# Patient Record
Sex: FEMALE | Race: WHITE | NOT HISPANIC OR LATINO | Employment: FULL TIME | URBAN - METROPOLITAN AREA
[De-identification: names, ages, dates, MRNs, and addresses within clinical notes are randomized per-mention and may not be internally consistent; named-entity substitution may affect disease eponyms.]

---

## 2019-08-26 ENCOUNTER — TELEPHONE (OUTPATIENT)
Dept: UROLOGY | Facility: CLINIC | Age: 39
End: 2019-08-26

## 2019-08-29 ENCOUNTER — OFFICE VISIT (OUTPATIENT)
Dept: NEPHROLOGY | Facility: CLINIC | Age: 39
End: 2019-08-29
Payer: COMMERCIAL

## 2019-08-29 ENCOUNTER — APPOINTMENT (OUTPATIENT)
Dept: LAB | Facility: HOSPITAL | Age: 39
End: 2019-08-29
Attending: UROLOGY
Payer: COMMERCIAL

## 2019-08-29 ENCOUNTER — OFFICE VISIT (OUTPATIENT)
Dept: UROLOGY | Facility: CLINIC | Age: 39
End: 2019-08-29
Payer: COMMERCIAL

## 2019-08-29 VITALS
DIASTOLIC BLOOD PRESSURE: 96 MMHG | BODY MASS INDEX: 48.44 KG/M2 | HEART RATE: 66 BPM | WEIGHT: 263.25 LBS | HEIGHT: 62 IN | SYSTOLIC BLOOD PRESSURE: 192 MMHG

## 2019-08-29 VITALS
BODY MASS INDEX: 48.48 KG/M2 | SYSTOLIC BLOOD PRESSURE: 170 MMHG | HEART RATE: 91 BPM | OXYGEN SATURATION: 98 % | HEIGHT: 62 IN | DIASTOLIC BLOOD PRESSURE: 60 MMHG | WEIGHT: 263.44 LBS

## 2019-08-29 DIAGNOSIS — R93.429 ABNORMAL ULTRASOUND OF KIDNEY: Primary | ICD-10-CM

## 2019-08-29 DIAGNOSIS — R93.429 ABNORMAL RENAL ULTRASOUND: Primary | ICD-10-CM

## 2019-08-29 DIAGNOSIS — R10.84 GENERALIZED ABDOMINAL PAIN: ICD-10-CM

## 2019-08-29 DIAGNOSIS — R07.9 CHEST PAIN, UNSPECIFIED TYPE: ICD-10-CM

## 2019-08-29 DIAGNOSIS — R31.29 MICROHEMATURIA: ICD-10-CM

## 2019-08-29 LAB
BACTERIA #/AREA URNS HPF: NORMAL /HPF
BILIRUB SERPL-MCNC: ABNORMAL MG/DL
BLOOD URINE, POC: ABNORMAL
COLOR, POC UA: YELLOW
GLUCOSE UR QL STRIP: ABNORMAL
KETONES UR QL STRIP: ABNORMAL
LEUKOCYTE ESTERASE URINE, POC: ABNORMAL
MICROSCOPIC COMMENT: NORMAL
NITRITE, POC UA: ABNORMAL
PH, POC UA: 6
PROTEIN, POC: ABNORMAL
RBC #/AREA URNS HPF: 1 /HPF (ref 0–4)
SPECIFIC GRAVITY, POC UA: 1.02
SQUAMOUS #/AREA URNS HPF: 3 /HPF
UROBILINOGEN, POC UA: 1
WBC #/AREA URNS HPF: 2 /HPF (ref 0–5)

## 2019-08-29 PROCEDURE — 99245 PR OFFICE CONSULTATION,LEVEL V: ICD-10-PCS | Mod: 25,S$GLB,, | Performed by: UROLOGY

## 2019-08-29 PROCEDURE — 81000 URINALYSIS NONAUTO W/SCOPE: CPT

## 2019-08-29 PROCEDURE — 99245 OFF/OP CONSLTJ NEW/EST HI 55: CPT | Mod: 25,S$GLB,, | Performed by: UROLOGY

## 2019-08-29 PROCEDURE — 99244 PR OFFICE CONSULTATION,LEVEL IV: ICD-10-PCS | Mod: S$GLB,,, | Performed by: INTERNAL MEDICINE

## 2019-08-29 PROCEDURE — 99999 PR PBB SHADOW E&M-EST. PATIENT-LVL III: CPT | Mod: PBBFAC,,, | Performed by: UROLOGY

## 2019-08-29 PROCEDURE — 99999 PR PBB SHADOW E&M-EST. PATIENT-LVL III: ICD-10-PCS | Mod: PBBFAC,,, | Performed by: INTERNAL MEDICINE

## 2019-08-29 PROCEDURE — 99244 OFF/OP CNSLTJ NEW/EST MOD 40: CPT | Mod: S$GLB,,, | Performed by: INTERNAL MEDICINE

## 2019-08-29 PROCEDURE — 99999 PR PBB SHADOW E&M-EST. PATIENT-LVL III: CPT | Mod: PBBFAC,,, | Performed by: INTERNAL MEDICINE

## 2019-08-29 PROCEDURE — 99999 PR PBB SHADOW E&M-EST. PATIENT-LVL III: ICD-10-PCS | Mod: PBBFAC,,, | Performed by: UROLOGY

## 2019-08-29 PROCEDURE — 81002 URINALYSIS NONAUTO W/O SCOPE: CPT | Mod: S$GLB,,, | Performed by: UROLOGY

## 2019-08-29 PROCEDURE — 81002 POCT URINE DIPSTICK WITHOUT MICROSCOPE: ICD-10-PCS | Mod: S$GLB,,, | Performed by: UROLOGY

## 2019-08-29 RX ORDER — BUPROPION HYDROCHLORIDE 100 MG/1
TABLET ORAL
COMMUNITY
Start: 2019-08-01 | End: 2019-12-06

## 2019-08-29 RX ORDER — METOPROLOL SUCCINATE 25 MG/1
25 TABLET, EXTENDED RELEASE ORAL NIGHTLY
Refills: 5 | COMMUNITY
Start: 2019-07-17 | End: 2019-12-13

## 2019-08-29 RX ORDER — NIFEDIPINE 30 MG/1
30 TABLET, FILM COATED, EXTENDED RELEASE ORAL
COMMUNITY
Start: 2019-05-06 | End: 2019-12-06

## 2019-08-29 RX ORDER — LISINOPRIL AND HYDROCHLOROTHIAZIDE 12.5; 2 MG/1; MG/1
1 TABLET ORAL 2 TIMES DAILY
COMMUNITY
End: 2020-12-17 | Stop reason: SDUPTHER

## 2019-08-29 RX ORDER — AMLODIPINE BESYLATE 10 MG/1
10 TABLET ORAL NIGHTLY
Refills: 3 | COMMUNITY
Start: 2019-08-02 | End: 2021-01-25

## 2019-08-29 RX ORDER — AZITHROMYCIN 250 MG/1
TABLET, FILM COATED ORAL
Refills: 0 | COMMUNITY
Start: 2019-08-15 | End: 2019-08-29

## 2019-08-29 NOTE — PATIENT INSTRUCTIONS
So she has an abnormal ultrasound the kidney in the left side, possibly a complex cyst or tumor or stone or nothing.  It was not seen on the ultrasound done just 2 months ago.  Neither than the best test.  Would prefer a CT abdomen with and without contrast to evaluate for stone or complex cyst which is what this most likely is if it is anything at all.  However they recommend a CT angiogram for her renal artery stenosis.  I have urgently referred her to Nephrology and they stated they could see her tomorrow.  I will wait to see what kind of test they are going to order and decide on imaging based on with a order.  If they do order CT angiogram I would like to see if we can get a CT without contrast 1st with the CT angiogram so we can compare before and after.  This would help us see if she has any stones or complex mass.    At minimum going to have her follow-up in 3 months so we can decide on further imaging if nothing done before then.    Also referred her to Dr. Corona follow with Cardiology for her chest pain jaw pain. Urgent referral.  She sees her PCP on Tuesday.    Follow up 3 months to review imaging    Route Dr. Saucedo and Barb Hadley

## 2019-08-29 NOTE — PROGRESS NOTES
Subjective:       Patient ID: Marilyn Madera is a 39 y.o. White female who presents for new patient evaluation for abnormal renal US.    Marilyn Madera is referred by RAGINI Chiang to be evaluated for abnormal renal US.  She reports she has had HTN for 13 years.  She had a recent renal US which showed a questionable area in her left mid-pole and was likely negative for SHANIQUE.  She is on four drugs for hypertension.  She has no uremic or urinary symptoms and is in her usual state of health.  There have been no recent illnesses, hospitalizations or procedures.        Review of Systems   Constitutional: Positive for fatigue. Negative for appetite change, chills and fever.   HENT: Negative for congestion.    Eyes: Negative for visual disturbance.   Respiratory: Negative for cough and shortness of breath.    Cardiovascular: Negative for chest pain and leg swelling.   Gastrointestinal: Negative for abdominal pain, diarrhea, nausea and vomiting.   Genitourinary: Negative for difficulty urinating, dysuria and hematuria.   Musculoskeletal: Negative for myalgias.   Skin: Negative for rash.   Neurological: Negative for headaches.   Hematological: Does not bruise/bleed easily.   Psychiatric/Behavioral: Negative for agitation and sleep disturbance.       The past medical, family and social histories were reviewed for this encounter.     Past Medical History:   Diagnosis Date    Hypertension      Past Surgical History:   Procedure Laterality Date    CHOLECYSTECTOMY      TUBAL LIGATION       Social History     Socioeconomic History    Marital status: Single     Spouse name: Not on file    Number of children: Not on file    Years of education: Not on file    Highest education level: Not on file   Occupational History    Not on file   Social Needs    Financial resource strain: Not on file    Food insecurity:     Worry: Not on file     Inability: Not on file    Transportation needs:     Medical: Not on file      "Non-medical: Not on file   Tobacco Use    Smoking status: Current Every Day Smoker    Tobacco comment: 3 - 4 cigs a day   Substance and Sexual Activity    Alcohol use: Yes     Comment: social    Drug use: Never    Sexual activity: Not on file   Lifestyle    Physical activity:     Days per week: Not on file     Minutes per session: Not on file    Stress: Not on file   Relationships    Social connections:     Talks on phone: Not on file     Gets together: Not on file     Attends Religion service: Not on file     Active member of club or organization: Not on file     Attends meetings of clubs or organizations: Not on file     Relationship status: Not on file   Other Topics Concern    Not on file   Social History Narrative    Not on file     Current Outpatient Medications   Medication Sig    amLODIPine (NORVASC) 10 MG tablet TAKE 1 TABLET BY MOUTH EVERY DAY FOR 90 DAYS    buPROPion (WELLBUTRIN) 100 MG tablet 1 qam x 1 week; then 1 bid x1 week; then 2 qam and 1 qpm x 1 week; then 2 bid thereafter    lisinopril-hydrochlorothiazide (PRINZIDE,ZESTORETIC) 20-12.5 mg per tablet lisinopril 20 mg-hydrochlorothiazide 12.5 mg tablet   TAKE TWO TABLETS BY MOUTH ONCE DAILY    metoprolol succinate (TOPROL-XL) 25 MG 24 hr tablet Take 25 mg by mouth nightly.    NIFEdipine (ADALAT CC) 30 MG TbSR Take 30 mg by mouth.     No current facility-administered medications for this visit.        BP (!) 170/60   Pulse 91   Ht 5' 2" (1.575 m)   Wt 119.5 kg (263 lb 7.2 oz)   LMP 08/01/2019   SpO2 98%   BMI 48.19 kg/m²     Objective:      Physical Exam   Constitutional: She is oriented to person, place, and time. She appears well-developed and well-nourished. No distress.   HENT:   Head: Normocephalic and atraumatic.   Eyes: Conjunctivae are normal.   Neck: Neck supple. No JVD present.   Cardiovascular: Normal rate, regular rhythm and normal heart sounds. Exam reveals no gallop and no friction rub.   No murmur " heard.  Pulmonary/Chest: Effort normal and breath sounds normal. No respiratory distress. She has no wheezes. She has no rales.   Abdominal: Soft. Bowel sounds are normal. She exhibits no distension (obese). There is no tenderness.   Musculoskeletal: She exhibits no edema.   Neurological: She is alert and oriented to person, place, and time.   Skin: Skin is warm and dry. No rash noted.   Psychiatric: She has a normal mood and affect.   Vitals reviewed.      Assessment:       1. Abnormal renal ultrasound    2. Generalized abdominal pain        Plan:   Return to clinic prn.  CT kidney to include renal arteries on 9/2 in [place].  Baseline creatinine is 0.6-0.8.  Renal US shows R 11.6 cm, L 11.6 cm.  We can get a CT with contrast of the kidneys to look at the questionable area on the left and the renal arteries.  More importantly, she has gained 30 pounds in the last year and 70 pounds in the last 2 years.  She urgently needs to diet and lose weight.  Otherwise we will never be able to control her blood pressure.  We discussed making lifestyle changes and using a Mediterranean diet for health benefits and weight loss.  I also noticed she is iron deficient and asked her to follow up with RAGINI Chiang to address this.

## 2019-08-29 NOTE — LETTER
August 29, 2019      Anne Marie Toussaint MD  [address] [place] [address]   [address]  [facility] [address]           [facility] [address] [facility] [address] [place] [address]  Phone: [phone]          Patient: Marilyn Madera   MR Number: [id number]   YOB: 1980   Date of Visit: 8/29/2019       Dear Dr. Anne Marie Toussaint:    Thank you for referring Marilyn Madera to me for evaluation. Attached you will find relevant portions of my assessment and plan of care.    If you have questions, please do not hesitate to call me. I look forward to following Marilyn Madera along with you.    Sincerely,    Sebastian Diez MD    Enclosure  CC:  No Recipients    If you would like to receive this communication electronically, please contact [email or web address] or [phone] to request more information on [employer] Link access.    For providers and/or their staff who would like to refer a patient to [facility], please contact us through our one-stop-shop provider referral line, [facility], at [phone].    If you feel you have received this communication in error or would no longer like to receive these types of communications, please e-mail [email or web address]

## 2019-08-29 NOTE — PROGRESS NOTES
[facility] Note - [place]  Staff: MD Norbert    Referring provider and please cc:   Karri Saucedo MD  [address] [place] [address]     PCP: RAGINI Chiang Utilization: signing up today     Chief Complaint: No chief complaint on file.      Subjective:        HPI: Marilyn Madera is a 39 y.o. female     Prior records available on Care everywhere    In May of 2019 she had a headache and neck pain went to [facility].  They ordered a CT head 05/03/2019 which was normal.  She also had an ultrasound then of her kidneys on 05/04/2019, the kidney function was normal.  In the ultrasound showed no abnormalities.  She was told she had an enlarged heart.  Blood pressure elevated despite being on 2 blood pressure meds, sees Dr. Snyder is a nurse practitioner regularly.  She ended up renal vascular ultrasound which showed results concerning for renal artery stenosis and recommended a CTA.  However that ultrasound also showed a 1.3 cm rounded echogenic focus in the left kidney without posterior acoustic shadowing that could be a cyst or mass or nothing that was not seen on previous ultrasound.  She has had no other abdominal imaging.     She does have trace blood in the urine.  Denies any recurrent urinary tract infections.  Denies any personal history of stones.  Then mother has had multiple stones. +smoker. No difficulty urinating. No gross hematuria. occ IBETH. Regular mense    She is supposed to see primary care to make follow-up for Cardiology.  She describes heart palpitations, jaw pain, feels like her heart is about to jump out of her chest but also admits to some anxiety too.  Teared up in appointment today.        Urinalysis void: tr bld - regular menses   Lab Results   Component Value Date    COLORU yellow 08/29/2019    SPECGRAV 1.025 08/29/2019    NITRITE n 08/29/2019      Urine history:   5/4/18 No cx, void: tr bld      REVIEW OF SYSTEMS:  General ROS: no fevers, no  chills  Psychological ROS: no depression  Endocrine ROS: no heat or cold  Respiratory ROS: no SOB  Cardiovascular ROS: + CP  Gastrointestinal ROS: no abdominal pain, no constipation, no diarrhea, no BRBPR  Musculoskeletal ROS: no muscle pain  Neurological ROS: no headaches  Dermatological ROS: no rashes  HEENT: noglasses, no sinus   ROS: per HPI     PMHx:  Past Medical History:   Diagnosis Date    Hypertension          PSHx:  Past Surgical History:   Procedure Laterality Date    CHOLECYSTECTOMY      TUBAL LIGATION         Stents/Valves/Foreign Bodies/Cardiac Evaluation/Cardiologist: none    No family history on file.   malignancies: none.   kidney stones: mother      Soc Hx:  Social History     Socioeconomic History    Marital status: Single     Spouse name: Not on file    Number of children: Not on file    Years of education: Not on file    Highest education level: Not on file   Occupational History    Not on file   Social Needs    Financial resource strain: Not on file    Food insecurity:     Worry: Not on file     Inability: Not on file    Transportation needs:     Medical: Not on file     Non-medical: Not on file   Tobacco Use    Smoking status: Not on file   Substance and Sexual Activity    Alcohol use: Not on file    Drug use: Not on file    Sexual activity: Not on file   Lifestyle    Physical activity:     Days per week: Not on file     Minutes per session: Not on file    Stress: Not on file   Relationships    Social connections:     Talks on phone: Not on file     Gets together: Not on file     Attends Yarsanism service: Not on file     Active member of club or organization: Not on file     Attends meetings of clubs or organizations: Not on file     Relationship status: Not on file   Other Topics Concern    Not on file   Social History Narrative    Not on file   smokes 3 to 4 cig/day trying to quit (used to smoke a pack a day). 22 pack year history.  No alcohol  2 children  [place]ives in  [place]  Patient's occupation:  at [employer]    Allergies:  Patient has no allergy information on record.    Anticoagulation/Aspirin: take bc powders for aches and pains    Objective:     Vitals:    08/29/19 1132   BP: (!) 192/96   Pulse: 66       General:WDWN in NAD  Eyes: PERRLA, normal conjunctiva  Respiratory: no increased work on breathing. No wheezing.   Cardiovascular: No obvious extremity edema. Warm and well perfused.   GI: no palpation of masses. No tenderness. No hepatosplenomegaly to palpation.  Musculoskeletal: normal range of motion of bilateral upper extremities. Normal muscle strength and tone.  Skin: no obvious rashes or lesions. No tightening of skin noted.  Neurologic: CN grossly normal. Normal sensation.   Psychiatric: awake, alert and oriented x 3. Mood and affect normal. Cooperative.    Pelvic exam   deferred    LABS REVIEW:    ]    ]labs reviewed in care everywhere  a1c 5.7 5/4/19  Cr 0.71 5/6/19    No results found for: LABA1C, HGBA1C       Recent Pertinent urologic PATHOLOGY REVIEW:      Recent Pertinent Urologic RADIOGRAPHIC REVIEW: (remainder of images reviewed)    No image results found.    Us renal artery 7/29/19  1. Limited exam secondary to habitus and overlying bowel gas with only partially visualization of the aorta and renal arteries.    2. Elevated renal artery to aorta ratio, possibly artifactual given the limited visualization of the aorta and renal arteries but concerning for occult renal artery stenosis. A CTA would be warranted for further characterization.     3. Rounded echogenic focus in the interpolar aspect of the left kidney, newly appreciated from the previous exam, similarly, possibly artifactual, related to a complex/complicated cyst containing milk of calcium, an angiomyolipoma, and much less likely malignancy. This can be further assessed with CT.    rbus 5/4/19  FINDINGS: Standard sonographic evaluation of the patient's bilateral kidneys was assessed  utilizing grayscale and color-flow imaging demonstrating decreased outline of the bilateral kidneys to in part to the patient's overlying bowel gas. The left kidney measures 11.1 cm in the longest dimension and the right kidney measures 11.8 cm longest dimension demonstrating evidence of normal cortical outline. Urinary bladder is filled to capacity without evidence of intraluminal masses nor significant wall thickening. Detail is limited given the patient's large body habitus. There is evidence of increased echogenicity of the kidneys that may be secondary to poor echo return given the patient's body habitus changes.            Assessment:       1. Abnormal ultrasound of kidney    2. Microhematuria    3. Chest pain, unspecified type          Plan:     There are no diagnoses linked to this encounter.    So she has an abnormal ultrasound the kidney in the left side, possibly a complex cyst or tumor or stone or nothing.  It was not seen on the ultrasound done just 2 months ago.  Neither than the best test.  Would prefer a CT abdomen with and without contrast to evaluate for stone or complex cyst which is what this most likely is if it is anything at all.  However they recommend a CT angiogram for her renal artery stenosis.  I have urgently referred her to Nephrology and they stated they could see her tomorrow.  I will wait to see what kind of test they are going to order and decide on imaging based on with a order.  If they do order CT angiogram I would like to see if we can get a CT without contrast 1st with the CT angiogram so we can compare before and after.  This would help us see if she has any stones or complex mass.    At minimum going to have her follow-up in 3 months so we can decide on further imaging if nothing done before then.    Also referred her to Dr. Corona follow with Cardiology for her chest pain jaw pain. Urgent referral.  She sees her PCP on Tuesday.    Sending UA microscopic, greater than 3 red  blood cells may need further imaging.  Does have a history of smoking 22 pack year.  Also may need just repeated cath urine for UA microscopic.    Follow up 3 months to review imaging    Route Dr. Saucedo and Barb Toussaint MD

## 2019-08-29 NOTE — LETTER
August 29, 2019      Karri Saucedo MD  [address] [facility]  [place] [address]           [facility]  [address] [place] [address] [address]  [facility] [address]  Phone: [phone]  Fax: [phone]          Patient: Marilyn Madera   MR Number: [id number]   YOB: 1980   Date of Visit: 8/29/2019       Dear Dr. Karri Saucedo:    Thank you for referring Marilyn Madera to me for evaluation. Attached you will find relevant portions of my assessment and plan of care.    If you have questions, please do not hesitate to call me. I look forward to following Marilyn Madera along with you.    Sincerely,    Anne Marie Toussaint MD    Enclosure  CC:  No Recipients    If you would like to receive this communication electronically, please contact [email or web address] or [phone] to request more information on [employer] Link access.    For providers and/or their staff who would like to refer a patient to [facility], please contact us through our one-stop-shop provider referral line, [facility], at [phone].    If you feel you have received this communication in error or would no longer like to receive these types of communications, please e-mail [email or web address]

## 2019-08-30 ENCOUNTER — TELEPHONE (OUTPATIENT)
Dept: NEPHROLOGY | Facility: CLINIC | Age: 39
End: 2019-08-30

## 2019-08-30 DIAGNOSIS — R93.429 ABNORMAL RENAL ULTRASOUND: Primary | ICD-10-CM

## 2019-08-30 NOTE — TELEPHONE ENCOUNTER
----- Message from Renetta Spear, RT sent at 8/30/2019  3:24 PM CDT -----  This pt is on our schedule for Tuesday and needs STAT Creatinine Serum orders -ordered and scheduled prior to her scan.  Thanks [phone]

## 2019-09-03 ENCOUNTER — HOSPITAL ENCOUNTER (OUTPATIENT)
Dept: RADIOLOGY | Facility: HOSPITAL | Age: 39
Discharge: HOME | End: 2019-09-03
Attending: INTERNAL MEDICINE
Payer: COMMERCIAL

## 2019-09-03 DIAGNOSIS — R10.84 GENERALIZED ABDOMINAL PAIN: ICD-10-CM

## 2019-09-03 PROCEDURE — 74178 CT ABD&PLV WO CNTR FLWD CNTR: CPT | Mod: 26,,, | Performed by: RADIOLOGY

## 2019-09-03 PROCEDURE — 74178 CT ABD&PLV WO CNTR FLWD CNTR: CPT | Mod: TC

## 2019-09-03 PROCEDURE — 25500020 PHARM REV CODE 255: Performed by: INTERNAL MEDICINE

## 2019-09-03 PROCEDURE — 74178 CT ABDOMEN PELVIS W WO CONTRAST: ICD-10-PCS | Mod: 26,,, | Performed by: RADIOLOGY

## 2019-09-03 RX ADMIN — IOHEXOL 100 ML: 350 INJECTION, SOLUTION INTRAVENOUS at 02:09

## 2019-09-04 ENCOUNTER — TELEPHONE (OUTPATIENT)
Dept: UROLOGY | Facility: CLINIC | Age: 39
End: 2019-09-04

## 2019-09-04 ENCOUNTER — TELEPHONE (OUTPATIENT)
Dept: NEPHROLOGY | Facility: CLINIC | Age: 39
End: 2019-09-04

## 2019-09-04 NOTE — TELEPHONE ENCOUNTER
Let her know I reviewed her CT scan  It showed 1 simple cyst in her right kidney and 2 simple cysts to her left kidney  No stones    At this point I would recommend she follow up in 1 year to monitor her micro hematuria  She only had 1 red blood cell on the urine  If she has bloody urine or greater than 3 red blood cells in the urine we in the future if ordered by her primary care before 1 year we may look in her bladder but at age less than 42 no real indication to do any further evaluation currently

## 2019-09-04 NOTE — TELEPHONE ENCOUNTER
----- Message from Judy Norris sent at 9/4/2019  7:56 AM CDT -----  Contact: self  Type:  Test Results    Who Called:  self  Name of Test (Lab/Mammo/Etc):  ultrasound  Date of Test:  09/03/19  Ordering Provider:  Dr Sebastian Diez  Where the test was performed:  [facility]  Best Call Back Number:  [phone] (home)   Additional Information:  Patient would like a call with the results. Thanks!

## 2019-09-04 NOTE — TELEPHONE ENCOUNTER
Spoke to pt. Will send to Dr. Diez to advise -     called pt. With result note - Please disregard my message to advise -

## 2019-09-05 ENCOUNTER — TELEPHONE (OUTPATIENT)
Dept: UROLOGY | Facility: CLINIC | Age: 39
End: 2019-09-05

## 2019-09-05 NOTE — TELEPHONE ENCOUNTER
Spoke with patient informed her of results and recommendations. Patient verbally voiced understanding. Cancel 3 month f/u?

## 2019-09-05 NOTE — TELEPHONE ENCOUNTER
----- Message from Chasidy Wolff sent at 9/5/2019  4:00 PM CDT -----  Contact: self [phone]  Patient returned your call, please call back.  Thank you!

## 2019-09-05 NOTE — TELEPHONE ENCOUNTER
Spoke with patient requesting ct results be released on portal. Informed patient she will have to contact ordering provider. Patient verbally voiced understanding.

## 2019-09-05 NOTE — TELEPHONE ENCOUNTER
----- Message from Cecelia Wu sent at 9/5/2019  2:47 PM CDT -----  Contact: Patient  Type:  Test Results    Who Called:  Patient  Name of Test (Lab/Mammo/Etc):  CT ABD PEL W CONTRAST [[id number]]  Date of Test:  9/3/19  Ordering Provider:  Anne Marie Toussaint  Where the test was performed:  [facility]  Best Call Back Number:    Additional Information:  Patient has additional questions about results that she received on patient portal.

## 2019-11-29 ENCOUNTER — HOSPITAL ENCOUNTER (EMERGENCY)
Facility: HOSPITAL | Age: 39
Discharge: OTHER INSTITUTION | End: 2019-11-30
Attending: EMERGENCY MEDICINE | Admitting: INTERNAL MEDICINE
Payer: COMMERCIAL

## 2019-11-29 DIAGNOSIS — R07.9 CHEST PAIN: Primary | ICD-10-CM

## 2019-11-29 LAB
ALBUMIN SERPL BCP-MCNC: 3.8 G/DL (ref 3.5–5.2)
ALP SERPL-CCNC: 84 U/L (ref 55–135)
ALT SERPL W/O P-5'-P-CCNC: 24 U/L (ref 10–44)
ANION GAP SERPL CALC-SCNC: 9 MMOL/L (ref 8–16)
AST SERPL-CCNC: 21 U/L (ref 10–40)
B-HCG UR QL: NEGATIVE
BASOPHILS # BLD AUTO: 0.04 K/UL (ref 0–0.2)
BASOPHILS NFR BLD: 0.7 % (ref 0–1.9)
BILIRUB SERPL-MCNC: 0.6 MG/DL (ref 0.1–1)
BILIRUB UR QL STRIP: NEGATIVE
BNP SERPL-MCNC: 27 PG/ML (ref 0–99)
BUN SERPL-MCNC: 15 MG/DL (ref 6–20)
CALCIUM SERPL-MCNC: 8.8 MG/DL (ref 8.7–10.5)
CHLORIDE SERPL-SCNC: 103 MMOL/L (ref 95–110)
CLARITY UR: CLEAR
CO2 SERPL-SCNC: 26 MMOL/L (ref 23–29)
COLOR UR: YELLOW
CREAT SERPL-MCNC: 0.8 MG/DL (ref 0.5–1.4)
CTP QC/QA: YES
DIFFERENTIAL METHOD: ABNORMAL
EOSINOPHIL # BLD AUTO: 0.2 K/UL (ref 0–0.5)
EOSINOPHIL NFR BLD: 3 % (ref 0–8)
ERYTHROCYTE [DISTWIDTH] IN BLOOD BY AUTOMATED COUNT: 16.8 % (ref 11.5–14.5)
EST. GFR  (AFRICAN AMERICAN): >60 ML/MIN/1.73 M^2
EST. GFR  (NON AFRICAN AMERICAN): >60 ML/MIN/1.73 M^2
GLUCOSE SERPL-MCNC: 213 MG/DL (ref 70–110)
GLUCOSE UR QL STRIP: NEGATIVE
HCT VFR BLD AUTO: 35 % (ref 37–48.5)
HGB BLD-MCNC: 10.7 G/DL (ref 12–16)
HGB UR QL STRIP: NEGATIVE
IMM GRANULOCYTES # BLD AUTO: 0.04 K/UL (ref 0–0.04)
IMM GRANULOCYTES NFR BLD AUTO: 0.7 % (ref 0–0.5)
KETONES UR QL STRIP: NEGATIVE
LEUKOCYTE ESTERASE UR QL STRIP: NEGATIVE
LYMPHOCYTES # BLD AUTO: 1.5 K/UL (ref 1–4.8)
LYMPHOCYTES NFR BLD: 26 % (ref 18–48)
MCH RBC QN AUTO: 23.3 PG (ref 27–31)
MCHC RBC AUTO-ENTMCNC: 30.6 G/DL (ref 32–36)
MCV RBC AUTO: 76 FL (ref 82–98)
MONOCYTES # BLD AUTO: 0.3 K/UL (ref 0.3–1)
MONOCYTES NFR BLD: 6 % (ref 4–15)
NEUTROPHILS # BLD AUTO: 3.6 K/UL (ref 1.8–7.7)
NEUTROPHILS NFR BLD: 63.6 % (ref 38–73)
NITRITE UR QL STRIP: NEGATIVE
NRBC BLD-RTO: 0 /100 WBC
PH UR STRIP: 6 [PH] (ref 5–8)
PLATELET # BLD AUTO: 277 K/UL (ref 150–350)
PMV BLD AUTO: 9.5 FL (ref 9.2–12.9)
POTASSIUM SERPL-SCNC: 3.4 MMOL/L (ref 3.5–5.1)
PROT SERPL-MCNC: 6.9 G/DL (ref 6–8.4)
PROT UR QL STRIP: NEGATIVE
RBC # BLD AUTO: 4.6 M/UL (ref 4–5.4)
SODIUM SERPL-SCNC: 138 MMOL/L (ref 136–145)
SP GR UR STRIP: 1.02 (ref 1–1.03)
TROPONIN I SERPL DL<=0.01 NG/ML-MCNC: <0.03 NG/ML (ref 0.02–0.04)
URN SPEC COLLECT METH UR: NORMAL
UROBILINOGEN UR STRIP-ACNC: NEGATIVE EU/DL
WBC # BLD AUTO: 5.7 K/UL (ref 3.9–12.7)

## 2019-11-29 PROCEDURE — 83880 ASSAY OF NATRIURETIC PEPTIDE: CPT

## 2019-11-29 PROCEDURE — 85025 COMPLETE CBC W/AUTO DIFF WBC: CPT

## 2019-11-29 PROCEDURE — 80053 COMPREHEN METABOLIC PANEL: CPT

## 2019-11-29 PROCEDURE — 84484 ASSAY OF TROPONIN QUANT: CPT

## 2019-11-29 PROCEDURE — 81003 URINALYSIS AUTO W/O SCOPE: CPT

## 2019-11-29 PROCEDURE — 93005 ELECTROCARDIOGRAM TRACING: CPT

## 2019-11-29 PROCEDURE — 81025 URINE PREGNANCY TEST: CPT | Performed by: EMERGENCY MEDICINE

## 2019-11-29 PROCEDURE — 25500020 PHARM REV CODE 255: Performed by: EMERGENCY MEDICINE

## 2019-11-29 PROCEDURE — 99285 EMERGENCY DEPT VISIT HI MDM: CPT | Mod: 25

## 2019-11-29 RX ADMIN — IOHEXOL 100 ML: 350 INJECTION, SOLUTION INTRAVENOUS at 11:11

## 2019-11-30 VITALS
RESPIRATION RATE: 24 BRPM | OXYGEN SATURATION: 96 % | HEIGHT: 62 IN | DIASTOLIC BLOOD PRESSURE: 56 MMHG | WEIGHT: 260 LBS | BODY MASS INDEX: 47.84 KG/M2 | HEART RATE: 80 BPM | SYSTOLIC BLOOD PRESSURE: 118 MMHG

## 2019-11-30 PROBLEM — E11.9 DM (DIABETES MELLITUS), TYPE 2: Status: ACTIVE | Noted: 2019-11-30

## 2019-11-30 PROBLEM — I10 HYPERTENSION: Status: ACTIVE | Noted: 2019-11-30

## 2019-11-30 PROBLEM — I20.89 STABLE ANGINA: Status: ACTIVE | Noted: 2019-11-30

## 2019-11-30 NOTE — ED PROVIDER NOTES
Encounter Date: 11/29/2019       History     Chief Complaint   Patient presents with    Chest Pain    Hypertension     39-year-old obese female with a history of HTN, HLD, DM presents to the ER with chest pain. Patient states that around 5:30 p.m., she began feeling a tightness in the center of her chest with associated sharp pain in her back.  Pain radiates to her left arm with some tingling.  Endorses some shortness of breath. States the pain was on and off for about 2 hr.  Resolved after getting nitroglycerin and aspirin by EMS.  She is not currently have any pain.  Denies fever, sweating, nausea or vomiting, abdominal pain, or changes in bowel or bladder function. Endorses occasional lower extremity swelling bilaterally. Denies history of DVT, PE, MI, stroke.  Denies recent long distance travel or surgery.  Current smoker.  History of CAD and extended family.  Patient notes that she is getting currently evaluated by Dr. SHAHZAD Corona for CAD.  She had an abnormal stress test earlier this month and has an angiogram scheduled for December 9th.        Review of patient's allergies indicates:   Allergen Reactions    Codeine Nausea Only and Nausea And Vomiting    Morphine Anaphylaxis and Rash     Past Medical History:   Diagnosis Date    Hypertension      Past Surgical History:   Procedure Laterality Date    CHOLECYSTECTOMY      TUBAL LIGATION       Family History   Problem Relation Age of Onset    Diabetes Father     Hypertension Father     Hypertension Mother      Social History     Tobacco Use    Smoking status: Current Every Day Smoker    Tobacco comment: 3 - 4 cigs a day   Substance Use Topics    Alcohol use: Yes     Comment: social    Drug use: Never     Review of Systems   Constitutional: Negative for fever.   HENT: Negative for sore throat.    Respiratory: Positive for shortness of breath.    Cardiovascular: Positive for chest pain and leg swelling. Negative for palpitations.   Gastrointestinal:  Negative for abdominal pain, nausea and vomiting.   Genitourinary: Negative for dysuria.   Musculoskeletal: Negative for back pain.   Skin: Negative for rash.   Neurological: Negative for weakness and light-headedness.   Hematological: Does not bruise/bleed easily.   All other systems reviewed and are negative.      Physical Exam     Initial Vitals [11/29/19 2039]   BP Pulse Resp Temp SpO2   137/69 98 20 -- 99 %      MAP       --         Physical Exam    Constitutional: She appears well-developed and well-nourished. No distress.   HENT:   Head: Normocephalic and atraumatic.   Mouth/Throat: Oropharynx is clear and moist.   Eyes: Conjunctivae and EOM are normal. Pupils are equal, round, and reactive to light.   Neck: Normal range of motion.   Cardiovascular: Normal rate, regular rhythm, intact distal pulses and normal pulses. Exam reveals no decreased pulses.    Murmur heard.   Systolic murmur is present with a grade of 4/6.  Pulmonary/Chest: Breath sounds normal. No respiratory distress. She has no wheezes. She has no rhonchi. She has no rales. She exhibits no tenderness.   Abdominal: Soft. Bowel sounds are normal. She exhibits no distension. There is no tenderness. There is no rebound and no guarding.   Musculoskeletal: Normal range of motion. She exhibits no edema or tenderness.   Neurological: She is alert. She has normal strength. No cranial nerve deficit or sensory deficit. GCS eye subscore is 4. GCS verbal subscore is 5. GCS motor subscore is 6.   AAO. EOMI, PERRL. CN II-XII intact. BUE and BLE 5/5 strength. Normal sensation.     Skin: Skin is warm and dry.   Psychiatric: She has a normal mood and affect. Thought content normal.         ED Course   Procedures  Labs Reviewed   CBC W/ AUTO DIFFERENTIAL   COMPREHENSIVE METABOLIC PANEL   B-TYPE NATRIURETIC PEPTIDE   TROPONIN I   POCT URINE PREGNANCY          Imaging Results    None          Medical Decision Making:   Initial Assessment:   39-year-old obese female  with a history of HTN, HLD, DM presents to the ER with chest pain starting around 1730.  ED Management:  Plan:  Afebrile, vital signs stable. Cardiac workup.  EKG shows normal sinus rhythm without acute ST changes.  Patient has a new murmur on my exam.  Will get CTA to evaluate for dissection.  Patient already received aspirin and nitroglycerin by EMS.  Planning admission.    Reassessed.  Patient lying in bed in no acute distress. She has remained asymptomatic since arrival.  Sec EKG shows normal sinus rhythm without acute ST changes, similar to previous.  Lab showed WBC 5.7.  Potassium 3 4, BUN 15 creatinine 0.8.  BNP 27, troponin less than 0.03.  UA shows no signs of bleeding or infection.  Chest x-ray shows no acute abnormality.  CTA of the chest shows no evidence of segmental PE.  Unremarkable aorta.  Left ventricle hypertrophy.  Small right cortical cyst.  Planned to admit here for further workup, likely angiogram.  We do not have interventional cardiology coverage until Monday, and patient may need an intervention during that time.  Hospitalist is not comfortable with this high risk patient who has a positive stress test to be admitted to the service without interventional cardiology available.  Discussed case with Dr. DARWIN Corona.  Recommends allowing the patient to make that choice whether she would rather stay or be transferred.  Dr. Duong discuss this with her.  She states she would rather be transferred.  Transfer to [facility] for further management.                                 Clinical Impression:       ICD-10-CM ICD-9-CM   1. Chest pain R07.9 786.50                             Jacob Torre MD  11/30/19 0246

## 2019-12-06 ENCOUNTER — HOSPITAL ENCOUNTER (OUTPATIENT)
Dept: PREADMISSION TESTING | Facility: HOSPITAL | Age: 39
Discharge: HOME | End: 2019-12-06
Attending: INTERNAL MEDICINE
Payer: COMMERCIAL

## 2019-12-06 ENCOUNTER — HOSPITAL ENCOUNTER (OUTPATIENT)
Dept: RADIOLOGY | Facility: HOSPITAL | Age: 39
Discharge: HOME | End: 2019-12-06
Attending: INTERNAL MEDICINE
Payer: COMMERCIAL

## 2019-12-06 VITALS — HEIGHT: 62 IN | WEIGHT: 263 LBS | BODY MASS INDEX: 48.4 KG/M2

## 2019-12-06 DIAGNOSIS — Z01.810 PRE-PROCEDURAL CARDIOVASCULAR EXAMINATION: Primary | ICD-10-CM

## 2019-12-06 DIAGNOSIS — Z01.818 PREOP EXAMINATION: ICD-10-CM

## 2019-12-06 DIAGNOSIS — Z01.812 PRE-PROCEDURE LAB EXAM: ICD-10-CM

## 2019-12-06 DIAGNOSIS — R07.9 CHEST PAIN: ICD-10-CM

## 2019-12-06 LAB
ANION GAP SERPL CALC-SCNC: 9 MMOL/L (ref 8–16)
APTT PPP: 29.4 SEC (ref 23.6–33.3)
B-HCG UR QL: NEGATIVE
BASOPHILS # BLD AUTO: 0.03 K/UL (ref 0–0.2)
BASOPHILS NFR BLD: 0.5 % (ref 0–1.9)
BUN SERPL-MCNC: 11 MG/DL (ref 6–20)
CALCIUM SERPL-MCNC: 9.5 MG/DL (ref 8.7–10.5)
CHLORIDE SERPL-SCNC: 103 MMOL/L (ref 95–110)
CHOLEST SERPL-MCNC: 192 MG/DL (ref 120–199)
CHOLEST/HDLC SERPL: 7.4 {RATIO} (ref 2–5)
CO2 SERPL-SCNC: 25 MMOL/L (ref 23–29)
CREAT SERPL-MCNC: 0.7 MG/DL (ref 0.5–1.4)
DIFFERENTIAL METHOD: ABNORMAL
EOSINOPHIL # BLD AUTO: 0.2 K/UL (ref 0–0.5)
EOSINOPHIL NFR BLD: 3.2 % (ref 0–8)
ERYTHROCYTE [DISTWIDTH] IN BLOOD BY AUTOMATED COUNT: 16.5 % (ref 11.5–14.5)
EST. GFR  (AFRICAN AMERICAN): >60 ML/MIN/1.73 M^2
EST. GFR  (NON AFRICAN AMERICAN): >60 ML/MIN/1.73 M^2
GLUCOSE SERPL-MCNC: 160 MG/DL (ref 70–110)
HCT VFR BLD AUTO: 37.7 % (ref 37–48.5)
HDLC SERPL-MCNC: 26 MG/DL (ref 40–75)
HDLC SERPL: 13.5 % (ref 20–50)
HGB BLD-MCNC: 11.5 G/DL (ref 12–16)
IMM GRANULOCYTES # BLD AUTO: 0.04 K/UL (ref 0–0.04)
IMM GRANULOCYTES NFR BLD AUTO: 0.7 % (ref 0–0.5)
INR PPP: 1
LDLC SERPL CALC-MCNC: 114 MG/DL (ref 63–159)
LYMPHOCYTES # BLD AUTO: 1.3 K/UL (ref 1–4.8)
LYMPHOCYTES NFR BLD: 23.5 % (ref 18–48)
MAGNESIUM SERPL-MCNC: 1.7 MG/DL (ref 1.6–2.6)
MCH RBC QN AUTO: 23 PG (ref 27–31)
MCHC RBC AUTO-ENTMCNC: 30.5 G/DL (ref 32–36)
MCV RBC AUTO: 75 FL (ref 82–98)
MONOCYTES # BLD AUTO: 0.4 K/UL (ref 0.3–1)
MONOCYTES NFR BLD: 6.3 % (ref 4–15)
NEUTROPHILS # BLD AUTO: 3.7 K/UL (ref 1.8–7.7)
NEUTROPHILS NFR BLD: 65.8 % (ref 38–73)
NONHDLC SERPL-MCNC: 166 MG/DL
NRBC BLD-RTO: 0 /100 WBC
PLATELET # BLD AUTO: 262 K/UL (ref 150–350)
PMV BLD AUTO: 9.2 FL (ref 9.2–12.9)
POTASSIUM SERPL-SCNC: 3.8 MMOL/L (ref 3.5–5.1)
PROTHROMBIN TIME: 12.7 SEC (ref 10.6–14.8)
RBC # BLD AUTO: 5.01 M/UL (ref 4–5.4)
SODIUM SERPL-SCNC: 137 MMOL/L (ref 136–145)
TRIGL SERPL-MCNC: 260 MG/DL (ref 30–150)
WBC # BLD AUTO: 5.57 K/UL (ref 3.9–12.7)

## 2019-12-06 PROCEDURE — 85610 PROTHROMBIN TIME: CPT

## 2019-12-06 PROCEDURE — 36415 COLL VENOUS BLD VENIPUNCTURE: CPT

## 2019-12-06 PROCEDURE — 81025 URINE PREGNANCY TEST: CPT

## 2019-12-06 PROCEDURE — 85730 THROMBOPLASTIN TIME PARTIAL: CPT

## 2019-12-06 PROCEDURE — 80061 LIPID PANEL: CPT

## 2019-12-06 PROCEDURE — 85025 COMPLETE CBC W/AUTO DIFF WBC: CPT

## 2019-12-06 PROCEDURE — 80048 BASIC METABOLIC PNL TOTAL CA: CPT

## 2019-12-06 PROCEDURE — 83735 ASSAY OF MAGNESIUM: CPT

## 2019-12-06 PROCEDURE — 83036 HEMOGLOBIN GLYCOSYLATED A1C: CPT

## 2019-12-06 PROCEDURE — 71046 X-RAY EXAM CHEST 2 VIEWS: CPT | Mod: TC

## 2019-12-09 ENCOUNTER — HOSPITAL ENCOUNTER (OUTPATIENT)
Facility: HOSPITAL | Age: 39
Discharge: HOME | End: 2019-12-11
Attending: INTERNAL MEDICINE | Admitting: HOSPITALIST
Payer: COMMERCIAL

## 2019-12-09 DIAGNOSIS — I49.9 ARRHYTHMIA: ICD-10-CM

## 2019-12-09 DIAGNOSIS — I20.89 STABLE ANGINA: ICD-10-CM

## 2019-12-09 DIAGNOSIS — I72.9 PSEUDOANEURYSM FOLLOWING PROCEDURE: Primary | ICD-10-CM

## 2019-12-09 DIAGNOSIS — Z01.812 PRE-PROCEDURE LAB EXAM: ICD-10-CM

## 2019-12-09 DIAGNOSIS — T81.718A PSEUDOANEURYSM FOLLOWING PROCEDURE: Primary | ICD-10-CM

## 2019-12-09 DIAGNOSIS — T14.8XXA HEMATOMA: ICD-10-CM

## 2019-12-09 DIAGNOSIS — R07.9 CHEST PAIN: ICD-10-CM

## 2019-12-09 DIAGNOSIS — R94.39 POSITIVE CARDIAC STRESS TEST: ICD-10-CM

## 2019-12-09 PROBLEM — I25.110 ATHEROSCLEROSIS OF NATIVE CORONARY ARTERY OF NATIVE HEART WITH UNSTABLE ANGINA PECTORIS: Status: ACTIVE | Noted: 2019-12-09

## 2019-12-09 PROBLEM — G47.30 SLEEP APNEA: Status: ACTIVE | Noted: 2019-12-09

## 2019-12-09 LAB
ABO + RH BLD: NORMAL
ALBUMIN SERPL BCP-MCNC: 3.9 G/DL (ref 3.5–5.2)
ANION GAP SERPL CALC-SCNC: 10 MMOL/L (ref 8–16)
APTT PPP: 31.2 SEC (ref 23.6–33.3)
BASOPHILS # BLD AUTO: 0.01 K/UL (ref 0–0.2)
BASOPHILS # BLD AUTO: 0.02 K/UL (ref 0–0.2)
BASOPHILS NFR BLD: 0.1 % (ref 0–1.9)
BASOPHILS NFR BLD: 0.2 % (ref 0–1.9)
BLD GP AB SCN CELLS X3 SERPL QL: NORMAL
BUN SERPL-MCNC: 20 MG/DL (ref 6–20)
CALCIUM SERPL-MCNC: 8.9 MG/DL (ref 8.7–10.5)
CHLORIDE SERPL-SCNC: 97 MMOL/L (ref 95–110)
CO2 SERPL-SCNC: 24 MMOL/L (ref 23–29)
CREAT SERPL-MCNC: 0.9 MG/DL (ref 0.5–1.4)
DIFFERENTIAL METHOD: ABNORMAL
DIFFERENTIAL METHOD: ABNORMAL
EOSINOPHIL # BLD AUTO: 0 K/UL (ref 0–0.5)
EOSINOPHIL # BLD AUTO: 0 K/UL (ref 0–0.5)
EOSINOPHIL NFR BLD: 0 % (ref 0–8)
EOSINOPHIL NFR BLD: 0.1 % (ref 0–8)
ERYTHROCYTE [DISTWIDTH] IN BLOOD BY AUTOMATED COUNT: 16.3 % (ref 11.5–14.5)
ERYTHROCYTE [DISTWIDTH] IN BLOOD BY AUTOMATED COUNT: 16.5 % (ref 11.5–14.5)
ERYTHROCYTE [DISTWIDTH] IN BLOOD BY AUTOMATED COUNT: 16.5 % (ref 11.5–14.5)
EST. GFR  (AFRICAN AMERICAN): >60 ML/MIN/1.73 M^2
EST. GFR  (NON AFRICAN AMERICAN): >60 ML/MIN/1.73 M^2
GLUCOSE SERPL-MCNC: 223 MG/DL (ref 70–110)
GLUCOSE SERPL-MCNC: 254 MG/DL (ref 70–110)
GLUCOSE SERPL-MCNC: 269 MG/DL (ref 70–110)
HCT VFR BLD AUTO: 33.8 % (ref 37–48.5)
HCT VFR BLD AUTO: 35.4 % (ref 37–48.5)
HCT VFR BLD AUTO: 35.4 % (ref 37–48.5)
HGB BLD-MCNC: 10.5 G/DL (ref 12–16)
HGB BLD-MCNC: 11 G/DL (ref 12–16)
HGB BLD-MCNC: 11 G/DL (ref 12–16)
IMM GRANULOCYTES # BLD AUTO: 0.07 K/UL (ref 0–0.04)
IMM GRANULOCYTES # BLD AUTO: 0.08 K/UL (ref 0–0.04)
IMM GRANULOCYTES NFR BLD AUTO: 0.9 % (ref 0–0.5)
IMM GRANULOCYTES NFR BLD AUTO: 0.9 % (ref 0–0.5)
INR PPP: 1.1
LYMPHOCYTES # BLD AUTO: 0.6 K/UL (ref 1–4.8)
LYMPHOCYTES # BLD AUTO: 0.8 K/UL (ref 1–4.8)
LYMPHOCYTES NFR BLD: 7.9 % (ref 18–48)
LYMPHOCYTES NFR BLD: 8.5 % (ref 18–48)
MCH RBC QN AUTO: 23.5 PG (ref 27–31)
MCHC RBC AUTO-ENTMCNC: 31.1 G/DL (ref 32–36)
MCV RBC AUTO: 76 FL (ref 82–98)
MONOCYTES # BLD AUTO: 0.1 K/UL (ref 0.3–1)
MONOCYTES # BLD AUTO: 0.3 K/UL (ref 0.3–1)
MONOCYTES NFR BLD: 1.2 % (ref 4–15)
MONOCYTES NFR BLD: 2.8 % (ref 4–15)
NEUTROPHILS # BLD AUTO: 7.3 K/UL (ref 1.8–7.7)
NEUTROPHILS # BLD AUTO: 8 K/UL (ref 1.8–7.7)
NEUTROPHILS NFR BLD: 87.6 % (ref 38–73)
NEUTROPHILS NFR BLD: 89.8 % (ref 38–73)
NRBC BLD-RTO: 0 /100 WBC
NRBC BLD-RTO: 0 /100 WBC
PHOSPHATE SERPL-MCNC: 3.1 MG/DL (ref 2.7–4.5)
PLATELET # BLD AUTO: 281 K/UL (ref 150–350)
PLATELET # BLD AUTO: 283 K/UL (ref 150–350)
PLATELET # BLD AUTO: 283 K/UL (ref 150–350)
PMV BLD AUTO: 9.1 FL (ref 9.2–12.9)
PMV BLD AUTO: 9.4 FL (ref 9.2–12.9)
PMV BLD AUTO: 9.4 FL (ref 9.2–12.9)
POTASSIUM SERPL-SCNC: 4.3 MMOL/L (ref 3.5–5.1)
PROTHROMBIN TIME: 13.5 SEC (ref 10.6–14.8)
RBC # BLD AUTO: 4.47 M/UL (ref 4–5.4)
RBC # BLD AUTO: 4.68 M/UL (ref 4–5.4)
RBC # BLD AUTO: 4.68 M/UL (ref 4–5.4)
SODIUM SERPL-SCNC: 131 MMOL/L (ref 136–145)
WBC # BLD AUTO: 8.12 K/UL (ref 3.9–12.7)
WBC # BLD AUTO: 8.12 K/UL (ref 3.9–12.7)
WBC # BLD AUTO: 9.13 K/UL (ref 3.9–12.7)

## 2019-12-09 PROCEDURE — 99152 MOD SED SAME PHYS/QHP 5/>YRS: CPT | Performed by: INTERNAL MEDICINE

## 2019-12-09 PROCEDURE — 96361 HYDRATE IV INFUSION ADD-ON: CPT | Mod: 59

## 2019-12-09 PROCEDURE — 63600175 PHARM REV CODE 636 W HCPCS: Performed by: INTERNAL MEDICINE

## 2019-12-09 PROCEDURE — G0378 HOSPITAL OBSERVATION PER HR: HCPCS

## 2019-12-09 PROCEDURE — 86850 RBC ANTIBODY SCREEN: CPT

## 2019-12-09 PROCEDURE — 96375 TX/PRO/DX INJ NEW DRUG ADDON: CPT | Performed by: INTERNAL MEDICINE

## 2019-12-09 PROCEDURE — C1752 CATH,HEMODIALYSIS,SHORT-TERM: HCPCS | Performed by: INTERNAL MEDICINE

## 2019-12-09 PROCEDURE — 80069 RENAL FUNCTION PANEL: CPT

## 2019-12-09 PROCEDURE — 85730 THROMBOPLASTIN TIME PARTIAL: CPT

## 2019-12-09 PROCEDURE — 25500020 PHARM REV CODE 255: Performed by: INTERNAL MEDICINE

## 2019-12-09 PROCEDURE — 93005 ELECTROCARDIOGRAM TRACING: CPT

## 2019-12-09 PROCEDURE — 25000003 PHARM REV CODE 250: Performed by: INTERNAL MEDICINE

## 2019-12-09 PROCEDURE — 36415 COLL VENOUS BLD VENIPUNCTURE: CPT

## 2019-12-09 PROCEDURE — 96374 THER/PROPH/DIAG INJ IV PUSH: CPT | Performed by: INTERNAL MEDICINE

## 2019-12-09 PROCEDURE — 94761 N-INVAS EAR/PLS OXIMETRY MLT: CPT

## 2019-12-09 PROCEDURE — C1894 INTRO/SHEATH, NON-LASER: HCPCS | Performed by: INTERNAL MEDICINE

## 2019-12-09 PROCEDURE — 96376 TX/PRO/DX INJ SAME DRUG ADON: CPT | Mod: 59

## 2019-12-09 PROCEDURE — 93458 L HRT ARTERY/VENTRICLE ANGIO: CPT | Mod: 73 | Performed by: INTERNAL MEDICINE

## 2019-12-09 PROCEDURE — 63600175 PHARM REV CODE 636 W HCPCS: Performed by: HOSPITALIST

## 2019-12-09 PROCEDURE — 25000003 PHARM REV CODE 250: Performed by: HOSPITALIST

## 2019-12-09 PROCEDURE — 85025 COMPLETE CBC W/AUTO DIFF WBC: CPT | Mod: 91

## 2019-12-09 PROCEDURE — 96375 TX/PRO/DX INJ NEW DRUG ADDON: CPT | Mod: 59

## 2019-12-09 PROCEDURE — 99153 MOD SED SAME PHYS/QHP EA: CPT | Performed by: INTERNAL MEDICINE

## 2019-12-09 PROCEDURE — C1725 CATH, TRANSLUMIN NON-LASER: HCPCS | Performed by: INTERNAL MEDICINE

## 2019-12-09 PROCEDURE — 85610 PROTHROMBIN TIME: CPT

## 2019-12-09 PROCEDURE — 63600175 PHARM REV CODE 636 W HCPCS

## 2019-12-09 RX ORDER — FENTANYL CITRATE 50 UG/ML
25 INJECTION, SOLUTION INTRAMUSCULAR; INTRAVENOUS EVERY 6 HOURS PRN
Status: DISCONTINUED | OUTPATIENT
Start: 2019-12-09 | End: 2019-12-09

## 2019-12-09 RX ORDER — SODIUM CHLORIDE 0.9 % (FLUSH) 0.9 %
10 SYRINGE (ML) INJECTION
Status: DISCONTINUED | OUTPATIENT
Start: 2019-12-09 | End: 2019-12-11 | Stop reason: HOSPADM

## 2019-12-09 RX ORDER — NITROGLYCERIN 0.4 MG/1
0.4 TABLET SUBLINGUAL EVERY 5 MIN PRN
Status: DISCONTINUED | OUTPATIENT
Start: 2019-12-09 | End: 2019-12-11 | Stop reason: HOSPADM

## 2019-12-09 RX ORDER — FENTANYL CITRATE 50 UG/ML
INJECTION, SOLUTION INTRAMUSCULAR; INTRAVENOUS
Status: DISCONTINUED | OUTPATIENT
Start: 2019-12-09 | End: 2019-12-11 | Stop reason: HOSPADM

## 2019-12-09 RX ORDER — HYDRALAZINE HYDROCHLORIDE 20 MG/ML
10 INJECTION INTRAMUSCULAR; INTRAVENOUS EVERY 6 HOURS PRN
Status: DISCONTINUED | OUTPATIENT
Start: 2019-12-09 | End: 2019-12-11 | Stop reason: HOSPADM

## 2019-12-09 RX ORDER — SODIUM CHLORIDE 450 MG/100ML
INJECTION, SOLUTION INTRAVENOUS CONTINUOUS
Status: DISCONTINUED | OUTPATIENT
Start: 2019-12-09 | End: 2019-12-11 | Stop reason: HOSPADM

## 2019-12-09 RX ORDER — ACETAMINOPHEN 325 MG/1
650 TABLET ORAL EVERY 4 HOURS PRN
Status: DISCONTINUED | OUTPATIENT
Start: 2019-12-09 | End: 2019-12-11 | Stop reason: HOSPADM

## 2019-12-09 RX ORDER — LANOLIN ALCOHOL/MO/W.PET/CERES
400 CREAM (GRAM) TOPICAL ONCE
Status: COMPLETED | OUTPATIENT
Start: 2019-12-09 | End: 2019-12-09

## 2019-12-09 RX ORDER — GLUCAGON 1 MG
1 KIT INJECTION
Status: DISCONTINUED | OUTPATIENT
Start: 2019-12-09 | End: 2019-12-11 | Stop reason: HOSPADM

## 2019-12-09 RX ORDER — METOPROLOL SUCCINATE 25 MG/1
25 TABLET, EXTENDED RELEASE ORAL NIGHTLY
Status: DISCONTINUED | OUTPATIENT
Start: 2019-12-09 | End: 2019-12-11 | Stop reason: HOSPADM

## 2019-12-09 RX ORDER — LIDOCAINE HYDROCHLORIDE 10 MG/ML
INJECTION, SOLUTION EPIDURAL; INFILTRATION; INTRACAUDAL; PERINEURAL
Status: DISCONTINUED | OUTPATIENT
Start: 2019-12-09 | End: 2019-12-11 | Stop reason: HOSPADM

## 2019-12-09 RX ORDER — MEPERIDINE HYDROCHLORIDE 50 MG/ML
INJECTION INTRAMUSCULAR; INTRAVENOUS; SUBCUTANEOUS
Status: COMPLETED
Start: 2019-12-09 | End: 2019-12-09

## 2019-12-09 RX ORDER — IBUPROFEN 200 MG
24 TABLET ORAL
Status: DISCONTINUED | OUTPATIENT
Start: 2019-12-09 | End: 2019-12-11 | Stop reason: HOSPADM

## 2019-12-09 RX ORDER — ONDANSETRON 2 MG/ML
4 INJECTION INTRAMUSCULAR; INTRAVENOUS EVERY 6 HOURS PRN
Status: DISCONTINUED | OUTPATIENT
Start: 2019-12-09 | End: 2019-12-11 | Stop reason: HOSPADM

## 2019-12-09 RX ORDER — MIDAZOLAM HYDROCHLORIDE 1 MG/ML
INJECTION INTRAMUSCULAR; INTRAVENOUS
Status: DISCONTINUED | OUTPATIENT
Start: 2019-12-09 | End: 2019-12-11 | Stop reason: HOSPADM

## 2019-12-09 RX ORDER — CLOPIDOGREL BISULFATE 75 MG/1
TABLET ORAL
Status: DISCONTINUED | OUTPATIENT
Start: 2019-12-09 | End: 2019-12-11 | Stop reason: HOSPADM

## 2019-12-09 RX ORDER — METHYLPREDNISOLONE SOD SUCC 125 MG
62.5 VIAL (EA) INJECTION ONCE
Status: COMPLETED | OUTPATIENT
Start: 2019-12-09 | End: 2019-12-09

## 2019-12-09 RX ORDER — DIPHENHYDRAMINE HCL 25 MG
50 CAPSULE ORAL ONCE
Status: COMPLETED | OUTPATIENT
Start: 2019-12-09 | End: 2019-12-09

## 2019-12-09 RX ORDER — AMLODIPINE BESYLATE 5 MG/1
10 TABLET ORAL DAILY
Status: DISCONTINUED | OUTPATIENT
Start: 2019-12-10 | End: 2019-12-11 | Stop reason: HOSPADM

## 2019-12-09 RX ORDER — FENTANYL CITRATE 50 UG/ML
25 INJECTION, SOLUTION INTRAMUSCULAR; INTRAVENOUS ONCE
Status: COMPLETED | OUTPATIENT
Start: 2019-12-09 | End: 2019-12-09

## 2019-12-09 RX ORDER — IBUPROFEN 200 MG
16 TABLET ORAL
Status: DISCONTINUED | OUTPATIENT
Start: 2019-12-09 | End: 2019-12-11 | Stop reason: HOSPADM

## 2019-12-09 RX ORDER — MUPIROCIN 20 MG/G
OINTMENT TOPICAL DAILY
Status: DISCONTINUED | OUTPATIENT
Start: 2019-12-10 | End: 2019-12-11 | Stop reason: HOSPADM

## 2019-12-09 RX ORDER — INSULIN ASPART 100 [IU]/ML
1-10 INJECTION, SOLUTION INTRAVENOUS; SUBCUTANEOUS
Status: DISCONTINUED | OUTPATIENT
Start: 2019-12-09 | End: 2019-12-11 | Stop reason: HOSPADM

## 2019-12-09 RX ORDER — MEPERIDINE HYDROCHLORIDE 25 MG/ML
25 INJECTION INTRAMUSCULAR; INTRAVENOUS; SUBCUTANEOUS ONCE
Status: COMPLETED | OUTPATIENT
Start: 2019-12-09 | End: 2019-12-09

## 2019-12-09 RX ORDER — MUPIROCIN 20 MG/G
OINTMENT TOPICAL ONCE
Status: DISCONTINUED | OUTPATIENT
Start: 2019-12-09 | End: 2019-12-09

## 2019-12-09 RX ORDER — ONDANSETRON 2 MG/ML
4 INJECTION INTRAMUSCULAR; INTRAVENOUS EVERY 8 HOURS PRN
Status: DISCONTINUED | OUTPATIENT
Start: 2019-12-09 | End: 2019-12-11 | Stop reason: HOSPADM

## 2019-12-09 RX ORDER — MEPERIDINE HYDROCHLORIDE 25 MG/ML
25 INJECTION INTRAMUSCULAR; INTRAVENOUS; SUBCUTANEOUS ONCE
Status: DISPENSED | OUTPATIENT
Start: 2019-12-09 | End: 2019-12-10

## 2019-12-09 RX ORDER — DIAZEPAM 5 MG/1
5 TABLET ORAL
Status: DISCONTINUED | OUTPATIENT
Start: 2019-12-09 | End: 2019-12-09

## 2019-12-09 RX ORDER — POTASSIUM CHLORIDE 20 MEQ/1
20 TABLET, EXTENDED RELEASE ORAL ONCE
Status: COMPLETED | OUTPATIENT
Start: 2019-12-09 | End: 2019-12-09

## 2019-12-09 RX ORDER — ISOSORBIDE MONONITRATE 30 MG/1
30 TABLET, EXTENDED RELEASE ORAL DAILY
Status: DISCONTINUED | OUTPATIENT
Start: 2019-12-10 | End: 2019-12-11 | Stop reason: HOSPADM

## 2019-12-09 RX ORDER — LABETALOL HYDROCHLORIDE 5 MG/ML
10 INJECTION, SOLUTION INTRAVENOUS EVERY 6 HOURS PRN
Status: DISCONTINUED | OUTPATIENT
Start: 2019-12-09 | End: 2019-12-11 | Stop reason: HOSPADM

## 2019-12-09 RX ORDER — ATORVASTATIN CALCIUM 40 MG/1
40 TABLET, FILM COATED ORAL NIGHTLY
Status: DISCONTINUED | OUTPATIENT
Start: 2019-12-09 | End: 2019-12-11 | Stop reason: HOSPADM

## 2019-12-09 RX ORDER — MIDAZOLAM HYDROCHLORIDE 1 MG/ML
1 INJECTION INTRAMUSCULAR; INTRAVENOUS ONCE
Status: COMPLETED | OUTPATIENT
Start: 2019-12-09 | End: 2019-12-09

## 2019-12-09 RX ORDER — OXYCODONE AND ACETAMINOPHEN 10; 325 MG/1; MG/1
1 TABLET ORAL EVERY 4 HOURS PRN
Status: DISCONTINUED | OUTPATIENT
Start: 2019-12-09 | End: 2019-12-11 | Stop reason: HOSPADM

## 2019-12-09 RX ORDER — FENTANYL CITRATE 50 UG/ML
25 INJECTION, SOLUTION INTRAMUSCULAR; INTRAVENOUS
Status: DISCONTINUED | OUTPATIENT
Start: 2019-12-09 | End: 2019-12-11 | Stop reason: HOSPADM

## 2019-12-09 RX ADMIN — HYDRALAZINE HYDROCHLORIDE 10 MG: 20 INJECTION INTRAMUSCULAR; INTRAVENOUS at 06:12

## 2019-12-09 RX ADMIN — FENTANYL CITRATE 25 MCG: 50 INJECTION INTRAMUSCULAR; INTRAVENOUS at 07:12

## 2019-12-09 RX ADMIN — ONDANSETRON 4 MG: 2 INJECTION INTRAMUSCULAR; INTRAVENOUS at 04:12

## 2019-12-09 RX ADMIN — FENTANYL CITRATE 25 MCG: 50 INJECTION INTRAMUSCULAR; INTRAVENOUS at 04:12

## 2019-12-09 RX ADMIN — DIPHENHYDRAMINE HYDROCHLORIDE 50 MG: 25 CAPSULE ORAL at 06:12

## 2019-12-09 RX ADMIN — MUPIROCIN: 20 OINTMENT TOPICAL at 09:12

## 2019-12-09 RX ADMIN — MAGNESIUM OXIDE 400 MG: 400 TABLET ORAL at 06:12

## 2019-12-09 RX ADMIN — FENTANYL CITRATE 25 MCG: 50 INJECTION INTRAMUSCULAR; INTRAVENOUS at 11:12

## 2019-12-09 RX ADMIN — MIDAZOLAM HYDROCHLORIDE 1 MG: 1 INJECTION, SOLUTION INTRAMUSCULAR; INTRAVENOUS at 11:12

## 2019-12-09 RX ADMIN — MEPERIDINE HYDROCHLORIDE 25 MG: 25 INJECTION INTRAMUSCULAR; INTRAVENOUS; SUBCUTANEOUS at 09:12

## 2019-12-09 RX ADMIN — SODIUM CHLORIDE: 0.45 INJECTION, SOLUTION INTRAVENOUS at 06:12

## 2019-12-09 RX ADMIN — POTASSIUM CHLORIDE 20 MEQ: 20 TABLET, EXTENDED RELEASE ORAL at 06:12

## 2019-12-09 RX ADMIN — OXYCODONE HYDROCHLORIDE AND ACETAMINOPHEN 1 TABLET: 10; 325 TABLET ORAL at 04:12

## 2019-12-09 RX ADMIN — FENTANYL CITRATE 25 MCG: 50 INJECTION, SOLUTION INTRAMUSCULAR; INTRAVENOUS at 06:12

## 2019-12-09 RX ADMIN — DIAZEPAM 5 MG: 5 TABLET ORAL at 06:12

## 2019-12-09 RX ADMIN — SODIUM CHLORIDE: 0.45 INJECTION, SOLUTION INTRAVENOUS at 07:12

## 2019-12-09 RX ADMIN — METOPROLOL SUCCINATE 25 MG: 25 TABLET, FILM COATED, EXTENDED RELEASE ORAL at 08:12

## 2019-12-09 RX ADMIN — ATORVASTATIN CALCIUM 40 MG: 40 TABLET, FILM COATED ORAL at 08:12

## 2019-12-09 RX ADMIN — MEPERIDINE HYDROCHLORIDE 25 MG: 50 INJECTION INTRAMUSCULAR; INTRAVENOUS; SUBCUTANEOUS at 07:12

## 2019-12-09 RX ADMIN — METHYLPREDNISOLONE SODIUM SUCCINATE 62.5 MG: 125 INJECTION, POWDER, FOR SOLUTION INTRAMUSCULAR; INTRAVENOUS at 06:12

## 2019-12-09 NOTE — Clinical Note
ostium   right coronary artery. Angiography performed of the right coronary arteries. Angiography performed via power injection with 6 mL contrast at 3 mL/s. ASHWINI

## 2019-12-09 NOTE — ASSESSMENT & PLAN NOTE
Patient had stable angina and positive stress test which led to today's coronary angiogram  Patient had significant coronary artery disease which was not amenable for PCI-detail cath report pending.  Patient is scheduled to go to tertiary care center for high-risk PCI at later time  Holding aspirin and Plavix due to hematoma  Continue other home medications  Counseled extensively on smoking cessation and lifestyle modification

## 2019-12-09 NOTE — PLAN OF CARE
U/s as bedside, noticed pt to start bleeding during procedure. Myself and IWONA Mckee at bedside to assess groin. Hematoma noted, immediate firm pressure to site. CESAR Corona notified. Ordered repeat US in 2 hours and to admit to ICU. pressure continued to be held by myself until 1627. Sabina from cath lab at bedside to take over holding pressure.

## 2019-12-09 NOTE — HPI
39-year-old lady with past medical history of hypertension, diabetes mellitus, obstructive sleep apnea on CPAP came for elective coronary angiogram.  After angiogram she was doing fine and right groin sheath was removed however later while using bedpan she strained and developed right groin hematoma.  Pressure was applied for extended period and hemostasis was achieved. Dr. SHAHZAD Corona contacted hospital medicine service to admit her for overnight observation.  Upon my assessment patient reports some right groin discomfort otherwise denies any chest pain, headache, dizziness, fever, chills, nausea, vomiting, bladder or bowel symptoms.  She is not on any cardiac trips.  She tells me that she was suffering from on and off chest pain and had positive stress test which led to this angiogram.     Past medical history:  As mentioned above  Family history:  Father has diabetes and hypertension, mother has hypertension  Social history:  Approximately 20 pack year smoking history, recently cut down to half pack every day, denies any alcohol recreational drug use  Allergies:  Morphine and codeine  Surgical history:  Cholecystectomy, tubal ligation

## 2019-12-09 NOTE — PLAN OF CARE
Pt noted to have hematoma to right groin. Maninder WHITE RN at bed holding pressure. CESAR Corona notified, ultrasound ordered and MD wants pt to be admitted overnight. Dante from cath lab called to look at site after 15 minutes of pressure held. Site now soft no bleeding noted.

## 2019-12-09 NOTE — H&P
[facility] Medicine  History & Physical    DOS:12/09/2019    Patient Name: Marilyn Madera  MRN: [id number]  Admission Date: 12/9/2019  Attending Physician: Lenard Corona MD   Primary Care Provider: RAGINI Chiang         Patient information was obtained from patient and Dr. SHAHZAD Corona.     Subjective:     Principal Problem:Hematoma    Chief Complaint:   Chief Complaint   Patient presents with    Groin Pain        HPI: 39-year-old lady with past medical history of hypertension, diabetes mellitus, obstructive sleep apnea on CPAP came for elective coronary angiogram.  After angiogram she was doing fine and right groin sheath was removed however later while using bedpan she strained and developed right groin hematoma.  Pressure was applied for extended period and hemostasis was achieved. Dr. SHAHZAD Corona contacted [facility] medicine service to admit her for overnight observation.  Upon my assessment patient reports some right groin discomfort otherwise denies any chest pain, headache, dizziness, fever, chills, nausea, vomiting, bladder or bowel symptoms.  She is not on any cardiac trips.  She tells me that she was suffering from on and off chest pain and had positive stress test which led to this angiogram.     Past medical history:  As mentioned above  Family history:  Father has diabetes and hypertension, mother has hypertension  Social history:  Approximately 20 pack year smoking history, recently cut down to half pack every day, denies any alcohol recreational drug use  Allergies:  Morphine and codeine  Surgical history:  Cholecystectomy, tubal ligation    Past Medical History:   Diagnosis Date    Diabetes mellitus     Hypertension        Past Surgical History:   Procedure Laterality Date    CHOLECYSTECTOMY      TUBAL LIGATION         Review of patient's allergies indicates:   Allergen Reactions    Codeine Nausea Only and Nausea And Vomiting    Morphine Anaphylaxis and Rash        No current facility-administered medications on file prior to encounter.      Current Outpatient Medications on File Prior to Encounter   Medication Sig    amLODIPine (NORVASC) 10 MG tablet TAKE 1 TABLET BY MOUTH EVERY DAY FOR 90 DAYS    lisinopril-hydrochlorothiazide (PRINZIDE,ZESTORETIC) 20-12.5 mg per tablet lisinopril 20 mg-hydrochlorothiazide 12.5 mg tablet   TAKE TWO TABLETS BY MOUTH ONCE DAILY    metoprolol succinate (TOPROL-XL) 25 MG 24 hr tablet Take 25 mg by mouth nightly.     Family History     Problem Relation (Age of Onset)    Diabetes Father    Hypertension Father, Mother        Tobacco Use    Smoking status: Current Every Day Smoker    Tobacco comment: 3 - 4 cigs a day   Substance and Sexual Activity    Alcohol use: Yes     Comment: social    Drug use: Never    Sexual activity: Not on file     Review of Systems   Constitutional: Negative for chills and fever.   HENT: Negative for sore throat.    Eyes: Negative for redness and itching.   Respiratory: Negative for chest tightness and shortness of breath.    Cardiovascular: Negative for chest pain and palpitations.   Gastrointestinal: Negative for blood in stool and nausea.   Genitourinary: Negative for dysuria.   Musculoskeletal: Negative for gait problem and joint swelling.   Skin: Positive for color change.        Right groin hematoma   Neurological: Negative for tremors and weakness.   Psychiatric/Behavioral: Negative for behavioral problems and sleep disturbance.   All other systems reviewed and are negative.    Objective:     Vital Signs (Most Recent):  Temp: 98.1 °F (36.7 °C) (12/09/19 0700)  Pulse: 74 (12/09/19 1230)  Resp: (!) 27 (12/09/19 1230)  BP: (!) 159/70 (12/09/19 1230)  SpO2: 96 % (12/09/19 1230) Vital Signs (24h Range):  Temp:  [98.1 °F (36.7 °C)] 98.1 °F (36.7 °C)  Pulse:  [72-81] 74  Resp:  [20-27] 27  SpO2:  [95 %-99 %] 96 %  BP: (145-172)/(58-82) 159/70        There is no height or weight on file to calculate  BMI.    Physical Exam   Constitutional: She is oriented to person, place, and time. She appears well-developed and well-nourished. No distress.   HENT:   Head: Atraumatic.   Mouth/Throat: Oropharynx is clear and moist.   Eyes: Pupils are equal, round, and reactive to light. EOM are normal.   Neck: Neck supple. No JVD present.   Cardiovascular: Normal rate, regular rhythm and normal heart sounds. Exam reveals no gallop.   No murmur heard.  Right groin has mild ecchymosis and small area of fluctuation.  Good pulses in right leg, neurovascularly intact RLE   Pulmonary/Chest: Breath sounds normal. No respiratory distress. She has no wheezes.   Abdominal: Soft. Bowel sounds are normal. She exhibits no distension. There is no rebound and no guarding.   Musculoskeletal: She exhibits no edema.   Lymphadenopathy:     She has no cervical adenopathy.   Neurological: She is alert and oriented to person, place, and time. No cranial nerve deficit.   Skin: Skin is warm. Capillary refill takes less than 2 seconds. No rash noted. She is not diaphoretic.   Psychiatric: Her behavior is normal.   Nursing note and vitals reviewed.        CRANIAL NERVES     CN III, IV, VI   Pupils are equal, round, and reactive to light.  Extraocular motions are normal.        Significant Labs: All pertinent labs within the past 24 hours have been reviewed.    Significant Imaging: I have reviewed all pertinent imaging results/findings within the past 24 hours.    Assessment/Plan:     * Hematoma  Right groin hematoma after coronary angiogram  Hemostasis was achieved by pressure application for extended period, neurovascularly intact, very small area of fluctuation  Admit to Cardiology B for observation  Frequent CBCs, ultrasound is ordered  Frequent neurovascular checks on right lower extremity  Repeat labs in morning  Resume home medications    Atherosclerosis of native coronary artery of native heart with unstable angina pectoris  Patient had stable  angina and positive stress test which led to today's coronary angiogram  Patient had significant coronary artery disease which was not amenable for PCI-detail cath report pending.  Patient is scheduled to go to tertiary care center for high-risk PCI at later time  Holding aspirin and Plavix due to hematoma  Continue other home medications  Counseled extensively on smoking cessation and lifestyle modification      DM (diabetes mellitus), type 2  Hold metformin  Sliding scale  Accuchecks      Hypertension  Resume home medications except lisinopril and hydrochlorothiazide  Gentle IV hydration    Sleep apnea  CPAP q.h.s.        VTE Risk Mitigation (From admission, onward)         Ordered     Reason for No Pharmacological VTE Prophylaxis  Once     Question:  Reasons:  Answer:  Active Bleeding    12/09/19 4941     IP VTE HIGH RISK PATIENT  Once      12/09/19 1008                   Jeremias Kwon MD  Department of Hospital Medicine   [facility]

## 2019-12-09 NOTE — Clinical Note
Catheter is inserted into the ostium   left main. Angiography performed of the left coronary arteries in multiple views. Angiography performed via power injection with 11 mL contrast at 5 mL/s. JL4

## 2019-12-09 NOTE — ASSESSMENT & PLAN NOTE
Right groin hematoma after coronary angiogram  Hemostasis was achieved by pressure application for extended period, neurovascularly intact, very small area of fluctuation  Admit to Cardiology B for observation  Frequent CBCs, ultrasound is ordered  Frequent neurovascular checks on right lower extremity  Repeat labs in morning  Resume home medications

## 2019-12-09 NOTE — SUBJECTIVE & OBJECTIVE
Past Medical History:   Diagnosis Date    Diabetes mellitus     Hypertension        Past Surgical History:   Procedure Laterality Date    CHOLECYSTECTOMY      TUBAL LIGATION         Review of patient's allergies indicates:   Allergen Reactions    Codeine Nausea Only and Nausea And Vomiting    Morphine Anaphylaxis and Rash       No current facility-administered medications on file prior to encounter.      Current Outpatient Medications on File Prior to Encounter   Medication Sig    amLODIPine (NORVASC) 10 MG tablet TAKE 1 TABLET BY MOUTH EVERY DAY FOR 90 DAYS    lisinopril-hydrochlorothiazide (PRINZIDE,ZESTORETIC) 20-12.5 mg per tablet lisinopril 20 mg-hydrochlorothiazide 12.5 mg tablet   TAKE TWO TABLETS BY MOUTH ONCE DAILY    metoprolol succinate (TOPROL-XL) 25 MG 24 hr tablet Take 25 mg by mouth nightly.     Family History     Problem Relation (Age of Onset)    Diabetes Father    Hypertension Father, Mother        Tobacco Use    Smoking status: Current Every Day Smoker    Tobacco comment: 3 - 4 cigs a day   Substance and Sexual Activity    Alcohol use: Yes     Comment: social    Drug use: Never    Sexual activity: Not on file     Review of Systems   Constitutional: Negative for chills and fever.   HENT: Negative for sore throat.    Eyes: Negative for redness and itching.   Respiratory: Negative for chest tightness and shortness of breath.    Cardiovascular: Negative for chest pain and palpitations.   Gastrointestinal: Negative for blood in stool and nausea.   Genitourinary: Negative for dysuria.   Musculoskeletal: Negative for gait problem and joint swelling.   Skin: Positive for color change.        Right groin hematoma   Neurological: Negative for tremors and weakness.   Psychiatric/Behavioral: Negative for behavioral problems and sleep disturbance.   All other systems reviewed and are negative.    Objective:     Vital Signs (Most Recent):  Temp: 98.1 °F (36.7 °C) (12/09/19 0700)  Pulse: 74  (12/09/19 1230)  Resp: (!) 27 (12/09/19 1230)  BP: (!) 159/70 (12/09/19 1230)  SpO2: 96 % (12/09/19 1230) Vital Signs (24h Range):  Temp:  [98.1 °F (36.7 °C)] 98.1 °F (36.7 °C)  Pulse:  [72-81] 74  Resp:  [20-27] 27  SpO2:  [95 %-99 %] 96 %  BP: (145-172)/(58-82) 159/70        There is no height or weight on file to calculate BMI.    Physical Exam   Constitutional: She is oriented to person, place, and time. She appears well-developed and well-nourished. No distress.   HENT:   Head: Atraumatic.   Mouth/Throat: Oropharynx is clear and moist.   Eyes: Pupils are equal, round, and reactive to light. EOM are normal.   Neck: Neck supple. No JVD present.   Cardiovascular: Normal rate, regular rhythm and normal heart sounds. Exam reveals no gallop.   No murmur heard.  Right groin has mild ecchymosis and small area of fluctuation.  Good pulses in right leg, neurovascularly intact RLE   Pulmonary/Chest: Breath sounds normal. No respiratory distress. She has no wheezes.   Abdominal: Soft. Bowel sounds are normal. She exhibits no distension. There is no rebound and no guarding.   Musculoskeletal: She exhibits no edema.   Lymphadenopathy:     She has no cervical adenopathy.   Neurological: She is alert and oriented to person, place, and time. No cranial nerve deficit.   Skin: Skin is warm. Capillary refill takes less than 2 seconds. No rash noted. She is not diaphoretic.   Psychiatric: Her behavior is normal.   Nursing note and vitals reviewed.        CRANIAL NERVES     CN III, IV, VI   Pupils are equal, round, and reactive to light.  Extraocular motions are normal.        Significant Labs: All pertinent labs within the past 24 hours have been reviewed.    Significant Imaging: I have reviewed all pertinent imaging results/findings within the past 24 hours.

## 2019-12-10 PROBLEM — I72.9 PSEUDOANEURYSM FOLLOWING PROCEDURE: Status: ACTIVE | Noted: 2019-12-10

## 2019-12-10 PROBLEM — T81.718A PSEUDOANEURYSM FOLLOWING PROCEDURE: Status: ACTIVE | Noted: 2019-12-10

## 2019-12-10 LAB
ALBUMIN SERPL BCP-MCNC: 3.5 G/DL (ref 3.5–5.2)
ANION GAP SERPL CALC-SCNC: 10 MMOL/L (ref 8–16)
APTT PPP: 29.8 SEC (ref 23.6–33.3)
BASOPHILS # BLD AUTO: 0.02 K/UL (ref 0–0.2)
BASOPHILS # BLD AUTO: 0.02 K/UL (ref 0–0.2)
BASOPHILS # BLD AUTO: 0.03 K/UL (ref 0–0.2)
BASOPHILS NFR BLD: 0.3 % (ref 0–1.9)
BASOPHILS NFR BLD: 0.3 % (ref 0–1.9)
BASOPHILS NFR BLD: 0.4 % (ref 0–1.9)
BUN SERPL-MCNC: 20 MG/DL (ref 6–20)
CALCIUM SERPL-MCNC: 8.5 MG/DL (ref 8.7–10.5)
CHLORIDE SERPL-SCNC: 102 MMOL/L (ref 95–110)
CO2 SERPL-SCNC: 23 MMOL/L (ref 23–29)
CREAT SERPL-MCNC: 0.8 MG/DL (ref 0.5–1.4)
DIFFERENTIAL METHOD: ABNORMAL
EOSINOPHIL # BLD AUTO: 0 K/UL (ref 0–0.5)
EOSINOPHIL # BLD AUTO: 0.1 K/UL (ref 0–0.5)
EOSINOPHIL # BLD AUTO: 0.1 K/UL (ref 0–0.5)
EOSINOPHIL NFR BLD: 0.1 % (ref 0–8)
EOSINOPHIL NFR BLD: 0.9 % (ref 0–8)
EOSINOPHIL NFR BLD: 1.6 % (ref 0–8)
ERYTHROCYTE [DISTWIDTH] IN BLOOD BY AUTOMATED COUNT: 16.5 % (ref 11.5–14.5)
ERYTHROCYTE [DISTWIDTH] IN BLOOD BY AUTOMATED COUNT: 16.5 % (ref 11.5–14.5)
ERYTHROCYTE [DISTWIDTH] IN BLOOD BY AUTOMATED COUNT: 16.6 % (ref 11.5–14.5)
EST. GFR  (AFRICAN AMERICAN): >60 ML/MIN/1.73 M^2
EST. GFR  (NON AFRICAN AMERICAN): >60 ML/MIN/1.73 M^2
ESTIMATED AVG GLUCOSE: 163 MG/DL (ref 68–131)
GLUCOSE SERPL-MCNC: 142 MG/DL (ref 70–110)
GLUCOSE SERPL-MCNC: 144 MG/DL (ref 70–110)
GLUCOSE SERPL-MCNC: 163 MG/DL (ref 70–110)
HBA1C MFR BLD HPLC: 7.3 % (ref 4.5–6.2)
HCT VFR BLD AUTO: 28.5 % (ref 37–48.5)
HCT VFR BLD AUTO: 30.5 % (ref 37–48.5)
HCT VFR BLD AUTO: 35.3 % (ref 37–48.5)
HGB BLD-MCNC: 10.3 G/DL (ref 12–16)
HGB BLD-MCNC: 8.5 G/DL (ref 12–16)
HGB BLD-MCNC: 9.3 G/DL (ref 12–16)
IMM GRANULOCYTES # BLD AUTO: 0.05 K/UL (ref 0–0.04)
IMM GRANULOCYTES # BLD AUTO: 0.05 K/UL (ref 0–0.04)
IMM GRANULOCYTES # BLD AUTO: 0.06 K/UL (ref 0–0.04)
IMM GRANULOCYTES NFR BLD AUTO: 0.6 % (ref 0–0.5)
IMM GRANULOCYTES NFR BLD AUTO: 0.8 % (ref 0–0.5)
IMM GRANULOCYTES NFR BLD AUTO: 0.8 % (ref 0–0.5)
INR PPP: 1.1
LYMPHOCYTES # BLD AUTO: 1.1 K/UL (ref 1–4.8)
LYMPHOCYTES # BLD AUTO: 1.5 K/UL (ref 1–4.8)
LYMPHOCYTES # BLD AUTO: 1.5 K/UL (ref 1–4.8)
LYMPHOCYTES NFR BLD: 13.5 % (ref 18–48)
LYMPHOCYTES NFR BLD: 18.8 % (ref 18–48)
LYMPHOCYTES NFR BLD: 24.1 % (ref 18–48)
MAGNESIUM SERPL-MCNC: 2.1 MG/DL (ref 1.6–2.6)
MCH RBC QN AUTO: 23 PG (ref 27–31)
MCH RBC QN AUTO: 23 PG (ref 27–31)
MCH RBC QN AUTO: 23.4 PG (ref 27–31)
MCHC RBC AUTO-ENTMCNC: 29.2 G/DL (ref 32–36)
MCHC RBC AUTO-ENTMCNC: 29.8 G/DL (ref 32–36)
MCHC RBC AUTO-ENTMCNC: 30.5 G/DL (ref 32–36)
MCV RBC AUTO: 77 FL (ref 82–98)
MCV RBC AUTO: 77 FL (ref 82–98)
MCV RBC AUTO: 79 FL (ref 82–98)
MONOCYTES # BLD AUTO: 0.4 K/UL (ref 0.3–1)
MONOCYTES # BLD AUTO: 0.5 K/UL (ref 0.3–1)
MONOCYTES # BLD AUTO: 0.6 K/UL (ref 0.3–1)
MONOCYTES NFR BLD: 5.3 % (ref 4–15)
MONOCYTES NFR BLD: 6.8 % (ref 4–15)
MONOCYTES NFR BLD: 7.4 % (ref 4–15)
NEUTROPHILS # BLD AUTO: 4.2 K/UL (ref 1.8–7.7)
NEUTROPHILS # BLD AUTO: 5.9 K/UL (ref 1.8–7.7)
NEUTROPHILS # BLD AUTO: 6.4 K/UL (ref 1.8–7.7)
NEUTROPHILS NFR BLD: 65.8 % (ref 38–73)
NEUTROPHILS NFR BLD: 72.5 % (ref 38–73)
NEUTROPHILS NFR BLD: 80 % (ref 38–73)
NRBC BLD-RTO: 0 /100 WBC
PHOSPHATE SERPL-MCNC: 3.6 MG/DL (ref 2.7–4.5)
PHOSPHATE SERPL-MCNC: 3.6 MG/DL (ref 2.7–4.5)
PLATELET # BLD AUTO: 194 K/UL (ref 150–350)
PLATELET # BLD AUTO: 231 K/UL (ref 150–350)
PLATELET # BLD AUTO: 256 K/UL (ref 150–350)
PMV BLD AUTO: 9.1 FL (ref 9.2–12.9)
PMV BLD AUTO: 9.4 FL (ref 9.2–12.9)
PMV BLD AUTO: 9.5 FL (ref 9.2–12.9)
POTASSIUM SERPL-SCNC: 4.3 MMOL/L (ref 3.5–5.1)
PROTHROMBIN TIME: 13.8 SEC (ref 10.6–14.8)
RBC # BLD AUTO: 3.69 M/UL (ref 4–5.4)
RBC # BLD AUTO: 3.98 M/UL (ref 4–5.4)
RBC # BLD AUTO: 4.47 M/UL (ref 4–5.4)
SODIUM SERPL-SCNC: 135 MMOL/L (ref 136–145)
WBC # BLD AUTO: 6.39 K/UL (ref 3.9–12.7)
WBC # BLD AUTO: 7.99 K/UL (ref 3.9–12.7)
WBC # BLD AUTO: 8.18 K/UL (ref 3.9–12.7)

## 2019-12-10 PROCEDURE — 63600175 PHARM REV CODE 636 W HCPCS

## 2019-12-10 PROCEDURE — 94761 N-INVAS EAR/PLS OXIMETRY MLT: CPT

## 2019-12-10 PROCEDURE — 63600175 PHARM REV CODE 636 W HCPCS: Performed by: INTERNAL MEDICINE

## 2019-12-10 PROCEDURE — 82962 GLUCOSE BLOOD TEST: CPT

## 2019-12-10 PROCEDURE — 96361 HYDRATE IV INFUSION ADD-ON: CPT

## 2019-12-10 PROCEDURE — 96375 TX/PRO/DX INJ NEW DRUG ADDON: CPT

## 2019-12-10 PROCEDURE — 25000003 PHARM REV CODE 250: Performed by: HOSPITALIST

## 2019-12-10 PROCEDURE — 83735 ASSAY OF MAGNESIUM: CPT

## 2019-12-10 PROCEDURE — 96365 THER/PROPH/DIAG IV INF INIT: CPT | Mod: 59

## 2019-12-10 PROCEDURE — 25000003 PHARM REV CODE 250: Performed by: INTERNAL MEDICINE

## 2019-12-10 PROCEDURE — 25000003 PHARM REV CODE 250: Performed by: SURGERY

## 2019-12-10 PROCEDURE — 27000221 HC OXYGEN, UP TO 24 HOURS

## 2019-12-10 PROCEDURE — 85025 COMPLETE CBC W/AUTO DIFF WBC: CPT | Mod: 91

## 2019-12-10 PROCEDURE — 25000003 PHARM REV CODE 250

## 2019-12-10 PROCEDURE — G0378 HOSPITAL OBSERVATION PER HR: HCPCS

## 2019-12-10 PROCEDURE — 96376 TX/PRO/DX INJ SAME DRUG ADON: CPT

## 2019-12-10 PROCEDURE — 51702 INSERT TEMP BLADDER CATH: CPT

## 2019-12-10 PROCEDURE — 85730 THROMBOPLASTIN TIME PARTIAL: CPT

## 2019-12-10 PROCEDURE — 80069 RENAL FUNCTION PANEL: CPT

## 2019-12-10 PROCEDURE — 63600175 PHARM REV CODE 636 W HCPCS: Performed by: HOSPITALIST

## 2019-12-10 PROCEDURE — 25500020 PHARM REV CODE 255: Performed by: HOSPITALIST

## 2019-12-10 PROCEDURE — 63600175 PHARM REV CODE 636 W HCPCS: Performed by: SURGERY

## 2019-12-10 PROCEDURE — 85610 PROTHROMBIN TIME: CPT

## 2019-12-10 PROCEDURE — 36415 COLL VENOUS BLD VENIPUNCTURE: CPT

## 2019-12-10 RX ORDER — MEPERIDINE HYDROCHLORIDE 25 MG/ML
25 INJECTION INTRAMUSCULAR; INTRAVENOUS; SUBCUTANEOUS ONCE
Status: COMPLETED | OUTPATIENT
Start: 2019-12-10 | End: 2019-12-10

## 2019-12-10 RX ORDER — LIDOCAINE HYDROCHLORIDE 10 MG/ML
INJECTION, SOLUTION EPIDURAL; INFILTRATION; INTRACAUDAL; PERINEURAL
Status: COMPLETED
Start: 2019-12-10 | End: 2019-12-10

## 2019-12-10 RX ORDER — LIDOCAINE HCL/PF 100 MG/5ML
SYRINGE (ML) INTRAVENOUS
Status: DISPENSED
Start: 2019-12-10 | End: 2019-12-11

## 2019-12-10 RX ORDER — CHLORHEXIDINE GLUCONATE ORAL RINSE 1.2 MG/ML
15 SOLUTION DENTAL 2 TIMES DAILY
Status: DISCONTINUED | OUTPATIENT
Start: 2019-12-10 | End: 2019-12-11 | Stop reason: HOSPADM

## 2019-12-10 RX ORDER — MEPERIDINE HYDROCHLORIDE 50 MG/ML
INJECTION INTRAMUSCULAR; INTRAVENOUS; SUBCUTANEOUS
Status: DISPENSED
Start: 2019-12-10 | End: 2019-12-10

## 2019-12-10 RX ORDER — MUPIROCIN 20 MG/G
OINTMENT TOPICAL 2 TIMES DAILY
Status: DISCONTINUED | OUTPATIENT
Start: 2019-12-10 | End: 2019-12-11 | Stop reason: HOSPADM

## 2019-12-10 RX ORDER — MEPERIDINE HYDROCHLORIDE 50 MG/ML
INJECTION INTRAMUSCULAR; INTRAVENOUS; SUBCUTANEOUS
Status: COMPLETED
Start: 2019-12-10 | End: 2019-12-10

## 2019-12-10 RX ORDER — MEPERIDINE HYDROCHLORIDE 25 MG/ML
12.5 INJECTION INTRAMUSCULAR; INTRAVENOUS; SUBCUTANEOUS ONCE
Status: COMPLETED | OUTPATIENT
Start: 2019-12-10 | End: 2019-12-10

## 2019-12-10 RX ADMIN — MEPERIDINE HYDROCHLORIDE 25 MG: 25 INJECTION INTRAMUSCULAR; INTRAVENOUS; SUBCUTANEOUS at 05:12

## 2019-12-10 RX ADMIN — AMLODIPINE BESYLATE 10 MG: 5 TABLET ORAL at 09:12

## 2019-12-10 RX ADMIN — METOPROLOL SUCCINATE 25 MG: 25 TABLET, FILM COATED, EXTENDED RELEASE ORAL at 08:12

## 2019-12-10 RX ADMIN — MEPERIDINE HYDROCHLORIDE 25 MG: 50 INJECTION INTRAMUSCULAR; INTRAVENOUS; SUBCUTANEOUS at 05:12

## 2019-12-10 RX ADMIN — IOHEXOL 120 ML: 350 INJECTION, SOLUTION INTRAVENOUS at 08:12

## 2019-12-10 RX ADMIN — MEPERIDINE HYDROCHLORIDE 25 MG: 25 INJECTION INTRAMUSCULAR; INTRAVENOUS; SUBCUTANEOUS at 08:12

## 2019-12-10 RX ADMIN — FENTANYL CITRATE 25 MCG: 50 INJECTION INTRAMUSCULAR; INTRAVENOUS at 08:12

## 2019-12-10 RX ADMIN — MUPIROCIN: 20 OINTMENT TOPICAL at 08:12

## 2019-12-10 RX ADMIN — FENTANYL CITRATE 25 MCG: 50 INJECTION INTRAMUSCULAR; INTRAVENOUS at 06:12

## 2019-12-10 RX ADMIN — ONDANSETRON 4 MG: 2 INJECTION INTRAMUSCULAR; INTRAVENOUS at 02:12

## 2019-12-10 RX ADMIN — PROMETHAZINE HYDROCHLORIDE 12.5 MG: 25 INJECTION INTRAMUSCULAR; INTRAVENOUS at 03:12

## 2019-12-10 RX ADMIN — OXYCODONE HYDROCHLORIDE AND ACETAMINOPHEN 1 TABLET: 10; 325 TABLET ORAL at 03:12

## 2019-12-10 RX ADMIN — ONDANSETRON 4 MG: 2 INJECTION INTRAMUSCULAR; INTRAVENOUS at 11:12

## 2019-12-10 RX ADMIN — THROMBIN, TOPICAL (BOVINE) 20000 UNITS: KIT at 05:12

## 2019-12-10 RX ADMIN — CHLORHEXIDINE GLUCONATE 15 ML: 1.2 RINSE ORAL at 12:12

## 2019-12-10 RX ADMIN — ATORVASTATIN CALCIUM 40 MG: 40 TABLET, FILM COATED ORAL at 08:12

## 2019-12-10 RX ADMIN — SODIUM CHLORIDE: 0.45 INJECTION, SOLUTION INTRAVENOUS at 01:12

## 2019-12-10 RX ADMIN — MEPERIDINE HYDROCHLORIDE 25 MG: 50 INJECTION INTRAMUSCULAR; INTRAVENOUS; SUBCUTANEOUS at 04:12

## 2019-12-10 RX ADMIN — MEPERIDINE HYDROCHLORIDE 12.5 MG: 25 INJECTION INTRAMUSCULAR; INTRAVENOUS; SUBCUTANEOUS at 11:12

## 2019-12-10 RX ADMIN — LIDOCAINE HYDROCHLORIDE 20 MG: 10 INJECTION, SOLUTION EPIDURAL; INFILTRATION; INTRACAUDAL; PERINEURAL at 05:12

## 2019-12-10 RX ADMIN — ISOSORBIDE MONONITRATE 30 MG: 30 TABLET, EXTENDED RELEASE ORAL at 09:12

## 2019-12-10 RX ADMIN — OXYCODONE HYDROCHLORIDE AND ACETAMINOPHEN 1 TABLET: 10; 325 TABLET ORAL at 10:12

## 2019-12-10 RX ADMIN — SODIUM CHLORIDE: 0.45 INJECTION, SOLUTION INTRAVENOUS at 05:12

## 2019-12-10 RX ADMIN — FENTANYL CITRATE 25 MCG: 50 INJECTION INTRAMUSCULAR; INTRAVENOUS at 12:12

## 2019-12-10 RX ADMIN — MUPIROCIN: 20 OINTMENT TOPICAL at 12:12

## 2019-12-10 RX ADMIN — MEPERIDINE HYDROCHLORIDE 25 MG: 25 INJECTION INTRAMUSCULAR; INTRAVENOUS; SUBCUTANEOUS at 11:12

## 2019-12-10 RX ADMIN — CHLORHEXIDINE GLUCONATE 15 ML: 1.2 RINSE ORAL at 08:12

## 2019-12-10 NOTE — PROGRESS NOTES
Patient is noted to have a right groin hematoma and a small pseudoaneurysm of suspected on ultrasound.  Repeat pressure was held manually with improvement of the same.  Patient was given Demerol for pain relief and continuous pressure was held for 30 min.  Patient's hemodynamics remained stable H&H is stable.  She has no anginal symptoms.  Patient is comfortable placed on her usual CPAP machine and saturations have been stable.  Recommend repeat ultrasound and ultrasound in the morning as well as repeat CBC q.6 hours.  Donald catheter has been placed by the nursing staff.  Discussed the findings and the plan with the patient's mother was at bedside.  Patient also understands this plan.  She is comfortable at this time.  Groin pain has resolved the area of ecchymosis has been marked.  Total time spent is 1-1/2 hours 7:45 until 9:17 p.m.

## 2019-12-10 NOTE — PLAN OF CARE
12/10/19 1515   Discharge Reassessment   Assessment Type Discharge Planning Reassessment   Cm went to discuss Advanced directives and gave pt a 5 Wishes Booklet. Instructed on how to fill out and that she can give to all of her doctors and the hospital when she comes in. Pt v/u and took the 5 Wishes Booklet.

## 2019-12-10 NOTE — NURSING
Dr. NELLY Corona and nurse Neptali from the cath lab at bedside. Manual pressure to right groin applied by md.

## 2019-12-10 NOTE — NURSING
Spoke to Dr. Khoobehi. Instructed to maintain pressure to angio site and to call ultrasound to assist in applying pressure to stop bleeding. Large hematoma palpated. Charge nurse at bedside applying pressure to groin.

## 2019-12-10 NOTE — NURSING
Dr. Khoobehi and ultrasound tech at bedside for thombin injection. Pt premedicated with demerol and additional demerol administered throughout procedure per Dr. Khoobehi order for total administration of 100 mg demerol during procedure. Tolerated procedure well. 02 sats and BP monitored. 02 sats remain % during procedure. See flowsheet for frequent BP monitoring. Ecchymosis and swelling at R groin site remains unchanged from previous assessments. Mother at bedside and updated on pt. Status and plan of care. Verbalized understanding.

## 2019-12-10 NOTE — NURSING
Pressure being held to R groin by Dr. Corona. Additional 12.5 mg demerol administered for comfort. Pt tolerating procedure well.

## 2019-12-10 NOTE — NURSING
Dr. Corona at bedside with [facility] ultrasound tech for ultrasound of R Groin region. Pt premedicated with demerol 25 mg prior to procedure.

## 2019-12-10 NOTE — ASSESSMENT & PLAN NOTE
Right groin hematoma after coronary angiogram  Overall today it appears that her hematoma has progressed however H and H is stable and patient is hemodynamically stable  CTA showed 16 x 12 mm pseudoaneurysm in the right groin approximately 22 mm superficial from the right common femoral artery with moderate subcutaneous edema in the right inguinal region extending into the right labia in upper thigh  Vascular surgery is consulted  Continue holding aspirin and Plavix  Further management as per Dr.Khoobehi  P.r.n. analgesia, p.r.n. antiemetics

## 2019-12-10 NOTE — PLAN OF CARE
Assessment done. Pt came in after having positive Stress test and angiogram was done. Pt is scheduled at [facility] to have another angiogram for stent [facility] was unable to do due to area of heart problem was in. Pt to discharge home on bedrest until Friday when she has angiogram at [facility]. Cm to follow until discharge from hospital.     12/10/19 0902   Discharge Assessment   Assessment Type Discharge Planning Assessment   Confirmed/corrected address and phone number on facesheet? Yes   Assessment information obtained from? Patient   Expected Length of Stay (days) 2   Communicated expected length of stay with patient/caregiver yes   Prior to hospitilization cognitive status: Alert/Oriented   Prior to hospitalization functional status: Independent   Current cognitive status: Alert/Oriented   Current Functional Status: Needs Assistance   Facility Arrived From: Home   Lives With child(leyda), adult;significant other;child(leyda), dependent  (Lives with SO and children)   Able to Return to Prior Arrangements yes   Is patient able to care for self after discharge? Yes   Who are your caregiver(s) and their phone number(s)? Ary Del Rosario  mother [phone]   Patient's perception of discharge disposition home or selfcare   Readmission Within the Last 30 Days previous discharge plan unsuccessful   If yes, most recent facility name: [facility].   Patient currently being followed by outpatient case management? No   Patient currently receives any other outside agency services? No   Equipment Currently Used at Home CPAP   Part D Coverage BCBS   Do you have any problems affording any of your prescribed medications? No   Is the patient taking medications as prescribed? yes   Does the patient have transportation home? Yes   Transportation Anticipated family or friend will provide   Does the patient receive services at the [facility]? No   Discharge Plan A Home with family   DME Needed Upon Discharge  none    Patient/Family in Agreement with Plan yes   Readmission Questionnaire   At the time of your discharge, did someone talk to you about what your health problems were? Yes   At the time of discharge, did someone talk to you about what to watch out for regarding worsening of your health problem? Yes   At the time of discharge, did someone talk to you about what to do if you experienced worsening of your health problem? Yes   At the time of discharge, did someone talk to you about which medication to take when you left the hospital and which ones to stop taking? Yes   At the time of discharge, did someone talk to you about when and where to follow up with a doctor after you left the hospital? Yes   What do you believe caused you to be sick enough to be re-admitted? Positive Stress test   How often do you need to have someone help you when you read instructions, pamphlets, or other written material from your doctor or pharmacy? Never   Do you have problems taking your medications as prescribed? No   Do you have any problems affording any of  your prescribed medications? No   Do you have problems obtaining/receiving your medications? No   Does the patient have transportation to healthcare appointments? Yes   Living Arrangements mobile home   Does the patient have family/friends to help with healtcare needs after discharge? yes   Does your caregiver provide all the help you need? Yes   Are you currently feeling confused? No   Are you currently having problems thinking? No   Are you currently having memory problems? No   Have you felt down, depressed, or hopeless? 0   Have you felt little interest or pleasure in doing things? 0   In the last 7 days, my sleep quality was: very good

## 2019-12-10 NOTE — SUBJECTIVE & OBJECTIVE
Interval History: Overall patient feels same.  Right groin hematoma looks little worse today with more subcutaneous edema extending up to her labia. H&H is stable. Hemodynamically stable.  Vascular surgery is consulted.       Review of Systems   Constitutional: Negative for chills and fever.   HENT: Negative for sore throat.    Eyes: Negative for redness and itching.   Respiratory: Negative for chest tightness and shortness of breath.    Cardiovascular: Negative for chest pain and palpitations.   Gastrointestinal: Negative for blood in stool and nausea.   Genitourinary: Negative for dysuria.   Musculoskeletal: Negative for gait problem and joint swelling.   Skin: Positive for color change.        Right groin hematoma   Neurological: Negative for tremors and weakness.   Psychiatric/Behavioral: Negative for behavioral problems and sleep disturbance.   All other systems reviewed and are negative.    Objective:     Vital Signs (Most Recent):  Temp: 97.7 °F (36.5 °C) (12/10/19 1200)  Pulse: 77 (12/10/19 1300)  Resp: (!) 23 (12/10/19 1300)  BP: (!) 121/56 (12/10/19 1300)  SpO2: 97 % (12/10/19 1300) Vital Signs (24h Range):  Temp:  [97.7 °F (36.5 °C)-98.6 °F (37 °C)] 97.7 °F (36.5 °C)  Pulse:  [] 77  Resp:  [13-32] 23  SpO2:  [92 %-100 %] 97 %  BP: (110-203)/(54-90) 121/56     Weight: 122.5 kg (270 lb 1 oz)  Body mass index is 49.4 kg/m².    Intake/Output Summary (Last 24 hours) at 12/10/2019 1529  Last data filed at 12/10/2019 0600  Gross per 24 hour   Intake 2300 ml   Output 1050 ml   Net 1250 ml      Physical Exam   Constitutional: She is oriented to person, place, and time. She appears well-developed and well-nourished. No distress.   HENT:   Head: Atraumatic.   Mouth/Throat: Oropharynx is clear and moist.   Eyes: Pupils are equal, round, and reactive to light. EOM are normal.   Neck: Neck supple. No JVD present.   Cardiovascular: Normal rate, regular rhythm and normal heart sounds. Exam reveals no gallop.   No  murmur heard.  Right groin has ecchymosis and small area of fluctuation that has worsened since my exam yesterday.  Soft tissue edema extending up to her labia.  Good pulses in right leg, neurovascularly intact RLE   Pulmonary/Chest: Breath sounds normal. No respiratory distress. She has no wheezes.   Abdominal: Soft. Bowel sounds are normal. She exhibits no distension. There is no rebound and no guarding.   Musculoskeletal: She exhibits no edema.   Lymphadenopathy:     She has no cervical adenopathy.   Neurological: She is alert and oriented to person, place, and time. No cranial nerve deficit.   Skin: Skin is warm. Capillary refill takes less than 2 seconds. No rash noted. She is not diaphoretic.   Psychiatric: Her behavior is normal.   Nursing note and vitals reviewed.      Significant Labs: All pertinent labs within the past 24 hours have been reviewed.    Significant Imaging: I have reviewed all pertinent imaging results/findings within the past 24 hours.

## 2019-12-10 NOTE — NURSING
Ultrasound complete. Per US tech bleeding noted. Manual pressure applied to right groin angio site and Dr. CESAR Corona called and notified. Dr. Khoobehi to be notified.

## 2019-12-10 NOTE — NURSING
Manual pressure held by Chloe Lew and CT nurse, Neptali. No current s/s of active bleeding, Hematoma decompressed manually throughout the past 2 hours and repeat doses of demerol and fentanyl administered for pain control per md orders. Repeat ultrasound was performed and reviewed by Dr. NELLY Corona. H/H and type and screen ordered. Will repeat US in the am per md orders. Patient AAOx3. Mother at bedside going to stay the night. Strict bedrest and activity restrictions reinforced. Patient confirms understanding.

## 2019-12-10 NOTE — HOSPITAL COURSE
12/10:  Overall patient feels same.  Right groin hematoma looks little worse today with more subcutaneous edema extending up to her labia. H&H is stable. Hemodynamically stable.  Imaging revealed right groin hematoma and pseudoaneurysm. Vascular surgery is consulted. She underwent tPA injection.     12/11: Repeat USG of right groin without recurrent pseudoaneurysm. Pain improved. Deemed stable for discharge to home. Pain medications ordered at the time of discharge.     Instructions provided to follow up with primary care physician as outpatient. Patient verbalized understanding and is aware to contact primary care physician or return to ED if new or worsening symptoms.    Physical exam on the day of discharge:  General: Patient resting comfortably in no acute distress.  Lungs: CTA. Good air entry.  Cor: Regular rate and rhythm. No murmurs. No pedal edema.  Abd: Soft. Nontender. Non-distended.  Neuro: A&O x3. Moving all 4 extremities equally  Ext: No clubbing. No cyanosis. Right groin with bruising and mild swelling . No appreciable hematoma

## 2019-12-10 NOTE — NURSING
Pt called out in intense pain and muscle cramping in cath site.  Site hard and raised. Began holding pressure and received the okay to place pressure dressing from Dr. Corona.  Pressure held for 15 minutes.

## 2019-12-10 NOTE — CONSULTS
[facility]  Vascular Surgery  Consult Note    Inpatient consult to Vascular Surgery  Consult performed by: Ali Khoobehi, MD  Consult ordered by: Jeremias Kwon MD        Subjective:     Chief Complaint/Reason for Admission: Cardiac cath    History of Present Illness: Pt s/p cardiac cath today, which was diagnostic. Pt for transfer to [facility] for possible PTCA. Following procedure, manual pressure was held but patient developed right groin hematoma. 2 cm PSA noted on US. Pt without complaints and is comfortable.          Medications Prior to Admission   Medication Sig Dispense Refill Last Dose    amLODIPine (NORVASC) 10 MG tablet TAKE 1 TABLET BY MOUTH EVERY DAY FOR 90 DAYS  3 12/8/2019 at 1400    aspirin (ECOTRIN) 81 MG EC tablet Take 81 mg by mouth once daily.   12/8/2019 at 1400    atorvastatin (LIPITOR) 40 MG tablet Take 40 mg by mouth nightly.  3 12/8/2019 at 2130    clopidogrel (PLAVIX) 75 mg tablet Take 1 tablet by mouth once daily.  0 12/9/2019 at 1400    isosorbide mononitrate (IMDUR) 30 MG 24 hr tablet Take 1 tablet (30 mg total) by mouth once daily. 30 tablet 0 12/8/2019 at 1400    lisinopril-hydrochlorothiazide (PRINZIDE,ZESTORETIC) 20-12.5 mg per tablet lisinopril 20 mg-hydrochlorothiazide 12.5 mg tablet   TAKE TWO TABLETS BY MOUTH ONCE DAILY   12/8/2019 at 1400    metFORMIN (GLUCOPHAGE) 500 MG tablet Take 500 mg by mouth 2 (two) times daily.  3 Past Week at 2100    metoprolol succinate (TOPROL-XL) 25 MG 24 hr tablet Take 25 mg by mouth nightly.  5 12/8/2019 at 2100    nitroGLYCERIN (NITROSTAT) 0.4 MG SL tablet Take 1 tablet by mouth every 5 (five) minutes as needed for Chest pain.  0 Past Week at Unknown time       Review of patient's allergies indicates:   Allergen Reactions    Codeine Nausea Only and Nausea And Vomiting    Morphine Anaphylaxis and Rash       Past Medical History:   Diagnosis Date    CHF (congestive heart failure)     Diabetes mellitus     Hypertension       Past Surgical History:   Procedure Laterality Date    CARDIAC CATHETERIZATION  12/09/2019    CHOLECYSTECTOMY      TUBAL LIGATION       Family History     Problem Relation (Age of Onset)    Diabetes Father    Heart disease Paternal Grandmother    Hypertension Father, Mother        Tobacco Use    Smoking status: Current Every Day Smoker     Packs/day: 0.25    Smokeless tobacco: Never Used    Tobacco comment: 3 - 4 cigs a day   Substance and Sexual Activity    Alcohol use: Yes     Comment: social    Drug use: Never    Sexual activity: Not on file     Review of Systems   Constitutional: Negative.    Cardiovascular: Negative for chest pain.   Gastrointestinal: Negative.    Musculoskeletal: Negative.    Neurological: Negative.    Hematological: Negative.    All other systems reviewed and are negative.    Objective:     Vital Signs (Most Recent):  Temp: 98.6 °F (37 °C) (12/09/19 1930)  Pulse: 72 (12/09/19 2240)  Resp: 16 (12/09/19 2240)  BP: (!) 117/57 (12/09/19 2240)  SpO2: (!) 94 % (12/09/19 2240) Vital Signs (24h Range):  Temp:  [98.1 °F (36.7 °C)-98.6 °F (37 °C)] 98.6 °F (37 °C)  Pulse:  [] 72  Resp:  [15-32] 16  SpO2:  [92 %-100 %] 94 %  BP: (116-203)/(56-90) 117/57     Weight: 122.5 kg (270 lb 1 oz)  Body mass index is 49.4 kg/m².        Physical Exam   Constitutional: She is oriented to person, place, and time. She appears well-developed and well-nourished.   HENT:   Head: Normocephalic.   Cardiovascular: Normal rate and regular rhythm.   Pulses:       Femoral pulses are 2+ on the right side, and 2+ on the left side.       Dorsalis pedis pulses are 2+ on the right side, and 2+ on the left side.        Posterior tibial pulses are 2+ on the right side, and 2+ on the left side.   Right groin soft, +ecchymoses, swelling   Pulmonary/Chest: Effort normal and breath sounds normal.   Abdominal: Soft. Bowel sounds are normal.   Neurological: She is alert and oriented to person, place, and time.   Skin:  Skin is warm and dry. Capillary refill takes less than 2 seconds.   Nursing note and vitals reviewed.      Significant Labs:  CBC:   Recent Labs   Lab 12/09/19  2215   WBC 9.13   RBC 4.47   HGB 10.5*   HCT 33.8*      MCV 76*   MCH 23.5*   MCHC 31.1*     CMP:   Recent Labs   Lab 12/09/19  1847   *   CALCIUM 8.9   ALBUMIN 3.9   *   K 4.3   CO2 24   CL 97   BUN 20   CREATININE 0.9       Significant Diagnostics:  I have reviewed all pertinent imaging results/findings within the past 24 hours.    Assessment/Plan:   Pt with 2 cm PSA, right groin hematoma. No evidence for expanding hematoma at this time.    Pt received contrast for cath today. Recommend CTA runoff to eval PSA in AM, as neck is unclear on US.      Active Diagnoses:    Diagnosis Date Noted POA    PRINCIPAL PROBLEM:  Hematoma [T14.8XXA] 12/09/2019 Yes    Atherosclerosis of native coronary artery of native heart with unstable angina pectoris [I25.110] 12/09/2019 Yes    Sleep apnea [G47.30] 12/09/2019 Yes    Pre-procedure lab exam [Z01.812] 12/09/2019 Not Applicable    DM (diabetes mellitus), type 2 [E11.9] 11/30/2019 Yes    Hypertension [I10] 11/30/2019 Yes      Problems Resolved During this Admission:       Thank you for your consult.    Ali Khoobehi, MD  Vascular Surgery  [facility]

## 2019-12-10 NOTE — NURSING
Dr. Khoobehi rounding. Patient assessed and POC discussed with patient. CT with run off in am. Groin unchanged. Will continue to monitor.

## 2019-12-10 NOTE — PROGRESS NOTES
Right groin was prepped and draped in a sterile fashion. Under US guidance, PSA was injected with  0.2 cc of TPA. The PSA appears to be thrombosed now. Pt tolerated procedure well.    Pt will need follow-up US in AM.

## 2019-12-10 NOTE — PROGRESS NOTES
[facility] Medicine  Progress Note    DOS:12/10/2019    Patient Name: Marilyn Madera  MRN: [id number]  Patient Class: OP- Observation   Admission Date: 12/9/2019  Length of Stay: 0 days  Attending Physician: Jeremias Kwon MD  Primary Care Provider: RAGINI Chiang        Subjective:     Principal Problem:Pseudoaneurysm following procedure        HPI:  39-year-old lady with past medical history of hypertension, diabetes mellitus, obstructive sleep apnea on CPAP came for elective coronary angiogram.  After angiogram she was doing fine and right groin sheath was removed however later while using bedpan she strained and developed right groin hematoma.  Pressure was applied for extended period and hemostasis was achieved. Dr. SHAHZAD Corona contacted hospital medicine service to admit her for overnight observation.  Upon my assessment patient reports some right groin discomfort otherwise denies any chest pain, headache, dizziness, fever, chills, nausea, vomiting, bladder or bowel symptoms.  She is not on any cardiac trips.  She tells me that she was suffering from on and off chest pain and had positive stress test which led to this angiogram.     Past medical history:  As mentioned above  Family history:  Father has diabetes and hypertension, mother has hypertension  Social history:  Approximately 20 pack year smoking history, recently cut down to half pack every day, denies any alcohol recreational drug use  Allergies:  Morphine and codeine  Surgical history:  Cholecystectomy, tubal ligation    Overview/Hospital Course:  12/10:  Overall patient feels same.  Right groin hematoma looks little worse today with more subcutaneous edema extending up to her labia. H&H is stable. Hemodynamically stable.  Vascular surgery is consulted.     Interval History: Overall patient feels same.  Right groin hematoma looks little worse today with more subcutaneous edema extending up to her labia. H&H is stable.  Hemodynamically stable.  Vascular surgery is consulted.       Review of Systems   Constitutional: Negative for chills and fever.   HENT: Negative for sore throat.    Eyes: Negative for redness and itching.   Respiratory: Negative for chest tightness and shortness of breath.    Cardiovascular: Negative for chest pain and palpitations.   Gastrointestinal: Negative for blood in stool and nausea.   Genitourinary: Negative for dysuria.   Musculoskeletal: Negative for gait problem and joint swelling.   Skin: Positive for color change.        Right groin hematoma   Neurological: Negative for tremors and weakness.   Psychiatric/Behavioral: Negative for behavioral problems and sleep disturbance.   All other systems reviewed and are negative.    Objective:     Vital Signs (Most Recent):  Temp: 97.7 °F (36.5 °C) (12/10/19 1200)  Pulse: 77 (12/10/19 1300)  Resp: (!) 23 (12/10/19 1300)  BP: (!) 121/56 (12/10/19 1300)  SpO2: 97 % (12/10/19 1300) Vital Signs (24h Range):  Temp:  [97.7 °F (36.5 °C)-98.6 °F (37 °C)] 97.7 °F (36.5 °C)  Pulse:  [] 77  Resp:  [13-32] 23  SpO2:  [92 %-100 %] 97 %  BP: (110-203)/(54-90) 121/56     Weight: 122.5 kg (270 lb 1 oz)  Body mass index is 49.4 kg/m².    Intake/Output Summary (Last 24 hours) at 12/10/2019 1529  Last data filed at 12/10/2019 0600  Gross per 24 hour   Intake 2300 ml   Output 1050 ml   Net 1250 ml      Physical Exam   Constitutional: She is oriented to person, place, and time. She appears well-developed and well-nourished. No distress.   HENT:   Head: Atraumatic.   Mouth/Throat: Oropharynx is clear and moist.   Eyes: Pupils are equal, round, and reactive to light. EOM are normal.   Neck: Neck supple. No JVD present.   Cardiovascular: Normal rate, regular rhythm and normal heart sounds. Exam reveals no gallop.   No murmur heard.  Right groin has ecchymosis and small area of fluctuation that has worsened since my exam yesterday.  Soft tissue edema extending up to her labia.   Good pulses in right leg, neurovascularly intact RLE   Pulmonary/Chest: Breath sounds normal. No respiratory distress. She has no wheezes.   Abdominal: Soft. Bowel sounds are normal. She exhibits no distension. There is no rebound and no guarding.   Musculoskeletal: She exhibits no edema.   Lymphadenopathy:     She has no cervical adenopathy.   Neurological: She is alert and oriented to person, place, and time. No cranial nerve deficit.   Skin: Skin is warm. Capillary refill takes less than 2 seconds. No rash noted. She is not diaphoretic.   Psychiatric: Her behavior is normal.   Nursing note and vitals reviewed.      Significant Labs: All pertinent labs within the past 24 hours have been reviewed.    Significant Imaging: I have reviewed all pertinent imaging results/findings within the past 24 hours.      Assessment/Plan:      * Pseudoaneurysm following procedure  Right groin hematoma after coronary angiogram  Overall today it appears that her hematoma has progressed however H and H is stable and patient is hemodynamically stable  CTA showed 16 x 12 mm pseudoaneurysm in the right groin approximately 22 mm superficial from the right common femoral artery with moderate subcutaneous edema in the right inguinal region extending into the right labia in upper thigh  Vascular surgery is consulted  Continue holding aspirin and Plavix  Further management as per Dr.Khoobehi  P.r.n. analgesia, p.r.n. antiemetics      Hematoma  As above    Atherosclerosis of native coronary artery of native heart with unstable angina pectoris  Patient had stable angina and positive stress test which led to today's coronary angiogram  Patient had significant coronary artery disease which was not amenable for PCI-detail cath report pending.  Patient is scheduled to go to tertiary care center for high-risk PCI at later time  Holding aspirin and Plavix due to hematoma  Continue other home medications  Counseled extensively on smoking cessation and  lifestyle modification      DM (diabetes mellitus), type 2  Hold metformin  Sliding scale  Accuchecks      Hypertension  Resume home medications except lisinopril and hydrochlorothiazide  Gentle IV hydration    Sleep apnea  CPAP q.h.s.        VTE Risk Mitigation (From admission, onward)         Ordered     Place THOMAS hose  Until discontinued      12/09/19 1713     Place sequential compression device  Until discontinued      12/09/19 1713     Reason for No Pharmacological VTE Prophylaxis  Once     Question:  Reasons:  Answer:  Active Bleeding    12/09/19 1558     IP VTE HIGH RISK PATIENT  Once      12/09/19 1558                      Jeremias Kwon MD  Department of Hospital Medicine   [facility]

## 2019-12-10 NOTE — PROGRESS NOTES
[facility]  Cardiology  Progress Note    Patient Name: Marilyn Madera  MRN: [id number]  Admission Date: 12/9/2019  Hospital Length of Stay: 0 days  Code Status: Full Code   Attending Physician: Jeremias Kwon MD   Primary Care Physician: RAGINI Chiang  Expected Discharge Date:   Principal Problem:Hematoma    Subjective:     Hospital Course: [facility] 12/9/19    Interval History: Patient resting in bed with no acute distress noted. Denies any chest pain.  Patient remains in a supine position with Donald catheter in place.  Mother is at bedside.    ROS     Review of Systems   Constitution: Negative for diaphoresis and fever.   HENT: Negative for nosebleeds.    Cardiovascular: Negative for chest pain, dyspnea, leg swelling and palpitations.   Respiratory: Negative for shortness of breath and wheezing.    Hematologic/Lymphatic: Negative for bleeding problem. Does not bruise/bleed easily.   Skin: Negative for color change and rash.   Musculoskeletal: Negative for falls and myalgias.   Gastrointestinal: Positive for right groin hematoma and a small pseudoaneurysm on US  Genitourinary: Negative for hematuria.   Neurological: Negative for dizziness and light-headedness.   Psychiatric/Behavioral: Negative for altered mental status and memory loss.     Objective:     Vital Signs (Most Recent):  Temp: 97.8 °F (36.6 °C) (12/10/19 0720)  Pulse: 80 (12/10/19 0900)  Resp: 19 (12/10/19 0900)  BP: 117/60 (12/10/19 0900)  SpO2: 98 % (12/10/19 0900) Vital Signs (24h Range):  Temp:  [97.7 °F (36.5 °C)-98.6 °F (37 °C)] 97.8 °F (36.6 °C)  Pulse:  [] 80  Resp:  [13-32] 19  SpO2:  [92 %-100 %] 98 %  BP: (116-203)/(56-90) 117/60     Weight: 122.5 kg (270 lb 1 oz)  Body mass index is 49.4 kg/m².    SpO2: 98 %  O2 Device (Oxygen Therapy): room air      Intake/Output Summary (Last 24 hours) at 12/10/2019 1007  Last data filed at 12/10/2019 0600  Gross per 24 hour   Intake 2300 ml   Output 1050 ml   Net 1250 ml        Lines/Drains/Airways     Drain                 Urethral Catheter 12/09/19 2140 16 Fr. less than 1 day          Peripheral Intravenous Line                 Peripheral IV - Single Lumen 12/09/19 0658 20 G Left Forearm 1 day         Peripheral IV - Single Lumen 12/09/19 0658 20 G Right Forearm 1 day                Physical Exam     HEENT: Normocephalic, atraumatic,   PERRL, Conjunctiva pink, no scleral icterus.   NECK:  No JVD no carotid bruit  CVS: S1S2+, RRR, no murmurs, rubs or gallops, JVP: Normal.  LUNGS: Clear  ABDOMEN: Soft, NT, BS+  EXTREMITIES: No cyanosis, clubbing or edema; Right groin hematoma/pseudoaneurysm  Gu: kaplan catheter, denies dysuria, no hematuria  NEURO: AAO X 3.   PSY :  Normal affect      Significant Labs:   Recent Lab Results       12/10/19  0636   12/10/19  0556   12/09/19  2215   12/09/19  2140   12/09/19  1847        Albumin   3.5     3.9     Anion Gap   10     10     aPTT 29.8  Comment:  Therapeutic Range of 67.5-105.1 secs.  Equivalent to Heparin Concentration of anti-Xa 0.3-0.7 IU/ml.         31.2  Comment:  Therapeutic Range of 67.5-105.1 secs.  Equivalent to Heparin Concentration of anti-Xa 0.3-0.7 IU/ml.       Baso #   0.02 0.02   0.01     Basophil%   0.3 0.2   0.1     BUN, Bld   20     20     Calcium   8.5     8.9     Chloride   102     97     CO2   23     24     Creatinine   0.8     0.9     Differential Method   Automated Automated   Automated     eGFR if    >60.0     >60.0     eGFR if non    >60.0  Comment:  Calculation used to obtain the estimated glomerular filtration  rate (eGFR) is the CKD-EPI equation.        >60.0  Comment:  Calculation used to obtain the estimated glomerular filtration  rate (eGFR) is the CKD-EPI equation.        Eos #   0.0 0.0   0.0     Eosinophil%   0.1 0.0   0.1     Glucose   163     269     Gran # (ANC)   6.4 8.0   7.3     Gran%   80.0 87.6   89.8     Group & Rh     O POS         Hematocrit   35.3 33.8   35.4               35.4     Hemoglobin   10.3 10.5   11.0              11.0     Immature Grans (Abs)   0.06  Comment:  Mild elevation in immature granulocytes is non specific and   can be seen in a variety of conditions including stress response,   acute inflammation, trauma and pregnancy. Correlation with other   laboratory and clinical findings is essential.   0.08  Comment:  Mild elevation in immature granulocytes is non specific and   can be seen in a variety of conditions including stress response,   acute inflammation, trauma and pregnancy. Correlation with other   laboratory and clinical findings is essential.     0.07  Comment:  Mild elevation in immature granulocytes is non specific and   can be seen in a variety of conditions including stress response,   acute inflammation, trauma and pregnancy. Correlation with other   laboratory and clinical findings is essential.       Immature Granulocytes   0.8 0.9   0.9     INDIRECT JOSE     NEG         Coumadin Monitoring INR   1.1  Comment:  Coumadin Therapy:  INR: 2.0-3.0 conventional anticoagulation  INR: 2.5-3.5 intensive anticoagulation       1.1  Comment:  Coumadin Therapy:  INR: 2.0-3.0 conventional anticoagulation  INR: 2.5-3.5 intensive anticoagulation       Lymph #   1.1 0.8   0.6     Lymph%   13.5 8.5   7.9     Magnesium   2.1           MCH   23.0 23.5   23.5              23.5     MCHC   29.2 31.1   31.1              31.1     MCV   79 76   76              76     Mono #   0.4 0.3   0.1     Mono%   5.3 2.8   1.2     MPV   9.5 9.1   9.4              9.4     nRBC   0 0   0     Phosphorus   3.6     3.1        3.6           Platelets   194 281   283              283     POC Glucose       223       Potassium   4.3     4.3     PT   13.8     13.5     RBC   4.47 4.47   4.68              4.68     RDW   16.6 16.3   16.5              16.5     Sodium   135     131     WBC   7.99 9.13   8.12              8.12                      12/09/19  1753        Albumin       Anion Gap        aPTT       Baso #       Basophil%       BUN, Bld       Calcium       Chloride       CO2       Creatinine       Differential Method       eGFR if        eGFR if non        Eos #       Eosinophil%       Glucose       Gran # (ANC)       Gran%       Group & Rh       Hematocrit       Hemoglobin       Immature Grans (Abs)       Immature Granulocytes       INDIRECT JOSE       Coumadin Monitoring INR       Lymph #       Lymph%       Magnesium       MCH       MCHC       MCV       Mono #       Mono%       MPV       nRBC       Phosphorus       Platelets       POC Glucose 254     Potassium       PT       RBC       RDW       Sodium       WBC             Significant Imagin/10/19 CTA Abd/pelvis:  Impression:       16 x 12 mm pseudoaneurysm within the right groin with moderate subcutaneous edema in the right inguinal region extending into the right labia in upper thigh    Normal CTA runoff    Fatty infiltration of the liver     12/10/19 RLE us:  Impression:       1. Negative for pseudoaneurysm.  The previous patent pseudoaneurysm has resolved.  2. Small hematoma along deep aspect of subcutaneous fat.     19 RLE us:  Impression:       Small patent pseudoaneurysm superficial to right common femoral artery and vein.       Assessment and Plan:     IMPRESSION:    1. Atherosclerotic coronary artery disease-multi-vessel disease  2. S/p LHC   3. Post procedure hematoma/ pseudoaneurysm - right groin  4. HFpEF  5. HTN  6. Obesity  7. Type 2 DM  8. CRISPIN    PLAN:  1.  Ultrasound of the groin.  There is going to be significant bruising in the right groin area.  Discussed with patient  2.  Needs further evaluation at a tertiary care center.  3.  Continue Plavix and aspirin.    Active Diagnoses:    Diagnosis Date Noted POA    PRINCIPAL PROBLEM:  Hematoma [T14.8XXA] 2019 Yes    Atherosclerosis of native coronary artery of native heart with unstable angina pectoris [I25.110] 2019 Yes     Sleep apnea [G47.30] 12/09/2019 Yes    Pre-procedure lab exam [Z01.812] 12/09/2019 Not Applicable    DM (diabetes mellitus), type 2 [E11.9] 11/30/2019 Yes    Hypertension [I10] 11/30/2019 Yes      Problems Resolved During this Admission:       VTE Risk Mitigation (From admission, onward)         Ordered     Place THOMAS hose  Until discontinued      12/09/19 1713     Place sequential compression device  Until discontinued      12/09/19 1713     Reason for No Pharmacological VTE Prophylaxis  Once     Question:  Reasons:  Answer:  Active Bleeding    12/09/19 1558     IP VTE HIGH RISK PATIENT  Once      12/09/19 1558                Marisol Elam NP  Cardiology  [facility]

## 2019-12-10 NOTE — NURSING
Bedside report received from Luana BARAHONA. R groin site reviewed with Luana BARAHONA. Site is ecchymotic, area of edema noted around R groin puncture site extending to R labia and inguinal region. Site has been marked with marker last night and has not extended past last night's marking. Pt c/o tenderness to R groin with palpation but has no other complaints at this time.

## 2019-12-11 VITALS
DIASTOLIC BLOOD PRESSURE: 55 MMHG | WEIGHT: 270.06 LBS | SYSTOLIC BLOOD PRESSURE: 112 MMHG | RESPIRATION RATE: 23 BRPM | HEART RATE: 74 BPM | OXYGEN SATURATION: 99 % | BODY MASS INDEX: 49.7 KG/M2 | HEIGHT: 62 IN | TEMPERATURE: 98 F

## 2019-12-11 PROBLEM — T81.718A PSEUDOANEURYSM FOLLOWING PROCEDURE: Status: RESOLVED | Noted: 2019-12-10 | Resolved: 2019-12-11

## 2019-12-11 PROBLEM — I72.9 PSEUDOANEURYSM FOLLOWING PROCEDURE: Status: RESOLVED | Noted: 2019-12-10 | Resolved: 2019-12-11

## 2019-12-11 PROBLEM — T14.8XXA HEMATOMA: Status: RESOLVED | Noted: 2019-12-09 | Resolved: 2019-12-11

## 2019-12-11 LAB
ALBUMIN SERPL BCP-MCNC: 3.1 G/DL (ref 3.5–5.2)
ANION GAP SERPL CALC-SCNC: 7 MMOL/L (ref 8–16)
APTT PPP: 30 SEC (ref 23.6–33.3)
BASOPHILS # BLD AUTO: 0.03 K/UL (ref 0–0.2)
BASOPHILS # BLD AUTO: 0.03 K/UL (ref 0–0.2)
BASOPHILS NFR BLD: 0.5 % (ref 0–1.9)
BASOPHILS NFR BLD: 0.5 % (ref 0–1.9)
BUN SERPL-MCNC: 19 MG/DL (ref 6–20)
CALCIUM SERPL-MCNC: 8 MG/DL (ref 8.7–10.5)
CHLORIDE SERPL-SCNC: 105 MMOL/L (ref 95–110)
CO2 SERPL-SCNC: 25 MMOL/L (ref 23–29)
CREAT SERPL-MCNC: 0.8 MG/DL (ref 0.5–1.4)
DIFFERENTIAL METHOD: ABNORMAL
DIFFERENTIAL METHOD: ABNORMAL
EOSINOPHIL # BLD AUTO: 0.1 K/UL (ref 0–0.5)
EOSINOPHIL # BLD AUTO: 0.1 K/UL (ref 0–0.5)
EOSINOPHIL NFR BLD: 1.6 % (ref 0–8)
EOSINOPHIL NFR BLD: 1.6 % (ref 0–8)
ERYTHROCYTE [DISTWIDTH] IN BLOOD BY AUTOMATED COUNT: 16.5 % (ref 11.5–14.5)
ERYTHROCYTE [DISTWIDTH] IN BLOOD BY AUTOMATED COUNT: 16.9 % (ref 11.5–14.5)
EST. GFR  (AFRICAN AMERICAN): >60 ML/MIN/1.73 M^2
EST. GFR  (NON AFRICAN AMERICAN): >60 ML/MIN/1.73 M^2
GLUCOSE SERPL-MCNC: 189 MG/DL (ref 70–110)
GLUCOSE SERPL-MCNC: 220 MG/DL (ref 70–110)
HCT VFR BLD AUTO: 28.2 % (ref 37–48.5)
HCT VFR BLD AUTO: 28.5 % (ref 37–48.5)
HGB BLD-MCNC: 8.3 G/DL (ref 12–16)
HGB BLD-MCNC: 8.7 G/DL (ref 12–16)
IMM GRANULOCYTES # BLD AUTO: 0.05 K/UL (ref 0–0.04)
IMM GRANULOCYTES # BLD AUTO: 0.05 K/UL (ref 0–0.04)
IMM GRANULOCYTES NFR BLD AUTO: 0.9 % (ref 0–0.5)
IMM GRANULOCYTES NFR BLD AUTO: 0.9 % (ref 0–0.5)
INR PPP: 1.1
LYMPHOCYTES # BLD AUTO: 1.2 K/UL (ref 1–4.8)
LYMPHOCYTES # BLD AUTO: 1.5 K/UL (ref 1–4.8)
LYMPHOCYTES NFR BLD: 21.1 % (ref 18–48)
LYMPHOCYTES NFR BLD: 27 % (ref 18–48)
MAGNESIUM SERPL-MCNC: 2.1 MG/DL (ref 1.6–2.6)
MCH RBC QN AUTO: 22.9 PG (ref 27–31)
MCH RBC QN AUTO: 23.6 PG (ref 27–31)
MCHC RBC AUTO-ENTMCNC: 29.4 G/DL (ref 32–36)
MCHC RBC AUTO-ENTMCNC: 30.5 G/DL (ref 32–36)
MCV RBC AUTO: 77 FL (ref 82–98)
MCV RBC AUTO: 78 FL (ref 82–98)
MONOCYTES # BLD AUTO: 0.4 K/UL (ref 0.3–1)
MONOCYTES # BLD AUTO: 0.4 K/UL (ref 0.3–1)
MONOCYTES NFR BLD: 7 % (ref 4–15)
MONOCYTES NFR BLD: 7.8 % (ref 4–15)
NEUTROPHILS # BLD AUTO: 3.5 K/UL (ref 1.8–7.7)
NEUTROPHILS # BLD AUTO: 3.8 K/UL (ref 1.8–7.7)
NEUTROPHILS NFR BLD: 62.2 % (ref 38–73)
NEUTROPHILS NFR BLD: 68.9 % (ref 38–73)
NRBC BLD-RTO: 0 /100 WBC
NRBC BLD-RTO: 0 /100 WBC
PHOSPHATE SERPL-MCNC: 3.1 MG/DL (ref 2.7–4.5)
PHOSPHATE SERPL-MCNC: 3.1 MG/DL (ref 2.7–4.5)
PLATELET # BLD AUTO: 200 K/UL (ref 150–350)
PLATELET # BLD AUTO: 225 K/UL (ref 150–350)
PMV BLD AUTO: 9.4 FL (ref 9.2–12.9)
PMV BLD AUTO: 9.8 FL (ref 9.2–12.9)
POTASSIUM SERPL-SCNC: 3.8 MMOL/L (ref 3.5–5.1)
PROTHROMBIN TIME: 13.5 SEC (ref 10.6–14.8)
RBC # BLD AUTO: 3.63 M/UL (ref 4–5.4)
RBC # BLD AUTO: 3.69 M/UL (ref 4–5.4)
SODIUM SERPL-SCNC: 137 MMOL/L (ref 136–145)
WBC # BLD AUTO: 5.55 K/UL (ref 3.9–12.7)
WBC # BLD AUTO: 5.66 K/UL (ref 3.9–12.7)

## 2019-12-11 PROCEDURE — G0378 HOSPITAL OBSERVATION PER HR: HCPCS

## 2019-12-11 PROCEDURE — 63600175 PHARM REV CODE 636 W HCPCS: Performed by: INTERNAL MEDICINE

## 2019-12-11 PROCEDURE — 96376 TX/PRO/DX INJ SAME DRUG ADON: CPT

## 2019-12-11 PROCEDURE — 63600175 PHARM REV CODE 636 W HCPCS: Performed by: HOSPITALIST

## 2019-12-11 PROCEDURE — 83735 ASSAY OF MAGNESIUM: CPT

## 2019-12-11 PROCEDURE — 85730 THROMBOPLASTIN TIME PARTIAL: CPT

## 2019-12-11 PROCEDURE — 96372 THER/PROPH/DIAG INJ SC/IM: CPT | Mod: 59

## 2019-12-11 PROCEDURE — 80069 RENAL FUNCTION PANEL: CPT

## 2019-12-11 PROCEDURE — 85610 PROTHROMBIN TIME: CPT

## 2019-12-11 PROCEDURE — 85025 COMPLETE CBC W/AUTO DIFF WBC: CPT

## 2019-12-11 PROCEDURE — 25000003 PHARM REV CODE 250: Performed by: HOSPITALIST

## 2019-12-11 PROCEDURE — 25000003 PHARM REV CODE 250: Performed by: INTERNAL MEDICINE

## 2019-12-11 PROCEDURE — 94761 N-INVAS EAR/PLS OXIMETRY MLT: CPT

## 2019-12-11 RX ORDER — FENTANYL CITRATE 50 UG/ML
25 INJECTION, SOLUTION INTRAMUSCULAR; INTRAVENOUS ONCE
Status: COMPLETED | OUTPATIENT
Start: 2019-12-11 | End: 2019-12-11

## 2019-12-11 RX ORDER — ONDANSETRON 4 MG/1
4 TABLET, FILM COATED ORAL EVERY 6 HOURS PRN
Qty: 15 TABLET | Refills: 0 | Status: SHIPPED | OUTPATIENT
Start: 2019-12-11 | End: 2019-12-15

## 2019-12-11 RX ORDER — OXYCODONE AND ACETAMINOPHEN 10; 325 MG/1; MG/1
1 TABLET ORAL EVERY 6 HOURS PRN
Qty: 15 TABLET | Refills: 0 | Status: SHIPPED | OUTPATIENT
Start: 2019-12-11 | End: 2019-12-15

## 2019-12-11 RX ADMIN — OXYCODONE HYDROCHLORIDE AND ACETAMINOPHEN 1 TABLET: 10; 325 TABLET ORAL at 11:12

## 2019-12-11 RX ADMIN — INSULIN ASPART 4 UNITS: 100 INJECTION, SOLUTION INTRAVENOUS; SUBCUTANEOUS at 11:12

## 2019-12-11 RX ADMIN — ONDANSETRON 4 MG: 2 INJECTION INTRAMUSCULAR; INTRAVENOUS at 01:12

## 2019-12-11 RX ADMIN — OXYCODONE HYDROCHLORIDE AND ACETAMINOPHEN 1 TABLET: 10; 325 TABLET ORAL at 05:12

## 2019-12-11 RX ADMIN — FENTANYL CITRATE 25 MCG: 50 INJECTION INTRAMUSCULAR; INTRAVENOUS at 01:12

## 2019-12-11 RX ADMIN — FENTANYL CITRATE 25 MCG: 50 INJECTION INTRAMUSCULAR; INTRAVENOUS at 08:12

## 2019-12-11 RX ADMIN — MUPIROCIN: 20 OINTMENT TOPICAL at 09:12

## 2019-12-11 RX ADMIN — AMLODIPINE BESYLATE 10 MG: 5 TABLET ORAL at 09:12

## 2019-12-11 RX ADMIN — FENTANYL CITRATE 25 MCG: 50 INJECTION INTRAMUSCULAR; INTRAVENOUS at 05:12

## 2019-12-11 RX ADMIN — CHLORHEXIDINE GLUCONATE 15 ML: 1.2 RINSE ORAL at 09:12

## 2019-12-11 RX ADMIN — ONDANSETRON 4 MG: 2 INJECTION INTRAMUSCULAR; INTRAVENOUS at 06:12

## 2019-12-11 RX ADMIN — FENTANYL CITRATE 25 MCG: 50 INJECTION, SOLUTION INTRAMUSCULAR; INTRAVENOUS at 06:12

## 2019-12-11 RX ADMIN — ISOSORBIDE MONONITRATE 30 MG: 30 TABLET, EXTENDED RELEASE ORAL at 09:12

## 2019-12-11 NOTE — PLAN OF CARE
This note also relates to the following rows which could not be included:  SpO2 - Cannot attach notes to unvalidated device data  Pulse - Cannot attach notes to unvalidated device data  Resp - Cannot attach notes to unvalidated device data       12/11/19 1025   PRE-TX-O2   O2 Device (Oxygen Therapy) room air   Pulse Oximetry Type Continuous   $ Pulse Oximetry - Multiple Charge Pulse Oximetry - Multiple

## 2019-12-11 NOTE — PLAN OF CARE
Bedrest maintained throughout the shift with logrolling q 2 hrs. THOMAS/SCDs maintained. H/H remains stable with serial checks q 8 hrs. Pt and family provided with education regarding plan of care.Verbalized understanding.

## 2019-12-11 NOTE — PLAN OF CARE
12/11/19 1522   Final Note   Assessment Type Final Discharge Note   Anticipated Discharge Disposition Home

## 2019-12-11 NOTE — PROGRESS NOTES
[facility]  Cardiology  Progress Note    Patient Name: Marilyn Madera  MRN: [id number]  Admission Date: 12/9/2019  Hospital Length of Stay: 0 days  Code Status: Full Code   Attending Physician: Kike Woodruff MD   Primary Care Physician: RAGINI Chiang  Expected Discharge Date:   Principal Problem:Pseudoaneurysm following procedure    Subjective:       Interval History: Patient resting in bed with no acute distress noted. States she is feeling well overall. No further bleeding from the right groin is noted or reported.     ROS     Constitution: Negative for diaphoresis and fever.   HENT: Negative for nosebleeds.    Cardiovascular: Negative for chest pain, dyspnea, leg swelling and palpitations.   Respiratory: Negative for shortness of breath and wheezing.    Hematologic/Lymphatic: Negative for bleeding problem. Does not bruise/bleed easily.   Skin: Negative for color change and rash.   Musculoskeletal: Negative for falls and myalgias.   Gastrointestinal: Positive for right groin hematoma and a small pseudoaneurysm on US- resolved on US 12/11/19  Genitourinary: Negative for hematuria.   Neurological: Negative for dizziness and light-headedness.   Psychiatric/Behavioral: Negative for altered mental status and memory loss.     Objective:     Vital Signs (Most Recent):  Temp: 98.6 °F (37 °C) (12/11/19 0730)  Pulse: 66 (12/11/19 1025)  Resp: (!) 22 (12/11/19 1025)  BP: (!) 127/57 (12/11/19 0730)  SpO2: (!) 92 % (12/11/19 1025) Vital Signs (24h Range):  Temp:  [97.7 °F (36.5 °C)-98.6 °F (37 °C)] 98.6 °F (37 °C)  Pulse:  [65-86] 66  Resp:  [14-23] 22  SpO2:  [92 %-99 %] 92 %  BP: (108-153)/(53-67) 127/57     Weight: 122.5 kg (270 lb 1 oz)  Body mass index is 49.4 kg/m².    SpO2: (!) 92 %  O2 Device (Oxygen Therapy): room air      Intake/Output Summary (Last 24 hours) at 12/11/2019 1147  Last data filed at 12/11/2019 0400  Gross per 24 hour   Intake 2590 ml   Output 1800 ml   Net 790 ml        Lines/Drains/Airways     Drain                 Urethral Catheter 12/09/19 2140 16 Fr. 1 day          Peripheral Intravenous Line                 Peripheral IV - Single Lumen 12/09/19 0658 20 G Left Forearm 2 days         Peripheral IV - Single Lumen 12/09/19 0658 20 G Right Forearm 2 days                Physical Exam     HEENT: Normocephalic, atraumatic,   PERRL, Conjunctiva pink, no scleral icterus.   NECK:  No JVD no carotid bruit  CVS: S1S2+, RRR, no murmurs, rubs or gallops, JVP: Normal.  LUNGS: Clear  ABDOMEN: Soft, NT, BS+  EXTREMITIES: No cyanosis, clubbing or edema; Right groin hematoma/pseudoaneurysm- improved  Gu: no kaplan catheter, denies dysuria, no hematuria  NEURO: AAO X 3.   PSY :  Normal affect    Significant Labs:   Recent Lab Results       12/11/19  0827   12/11/19  0344   12/10/19  2206   12/10/19  1605   12/10/19  1511        Albumin   3.1           Anion Gap   7           aPTT   30.0  Comment:  Therapeutic Range of 67.5-105.1 secs.  Equivalent to Heparin Concentration of anti-Xa 0.3-0.7 IU/ml.             Baso # 0.03 0.03 0.02   0.03     Basophil% 0.5 0.5 0.3   0.4     BUN, Bld   19           Calcium   8.0           Chloride   105           CO2   25           Creatinine   0.8           Differential Method Automated Automated Automated   Automated     eGFR if    >60.0           eGFR if non    >60.0  Comment:  Calculation used to obtain the estimated glomerular filtration  rate (eGFR) is the CKD-EPI equation.              Eos # 0.1 0.1 0.1   0.1     Eosinophil% 1.6 1.6 1.6   0.9     Glucose   189           Gran # (ANC) 3.8 3.5 4.2   5.9     Gran% 68.9 62.2 65.8   72.5     Hematocrit 28.5 28.2 28.5   30.5     Hemoglobin 8.7 8.3 8.5   9.3     Immature Grans (Abs) 0.05  Comment:  Mild elevation in immature granulocytes is non specific and   can be seen in a variety of conditions including stress response,   acute inflammation, trauma and pregnancy. Correlation  with other   laboratory and clinical findings is essential.   0.05  Comment:  Mild elevation in immature granulocytes is non specific and   can be seen in a variety of conditions including stress response,   acute inflammation, trauma and pregnancy. Correlation with other   laboratory and clinical findings is essential.   0.05  Comment:  Mild elevation in immature granulocytes is non specific and   can be seen in a variety of conditions including stress response,   acute inflammation, trauma and pregnancy. Correlation with other   laboratory and clinical findings is essential.     0.05  Comment:  Mild elevation in immature granulocytes is non specific and   can be seen in a variety of conditions including stress response,   acute inflammation, trauma and pregnancy. Correlation with other   laboratory and clinical findings is essential.       Immature Granulocytes 0.9 0.9 0.8   0.6     Coumadin Monitoring INR   1.1  Comment:  Coumadin Therapy:  INR: 2.0-3.0 conventional anticoagulation  INR: 2.5-3.5 intensive anticoagulation             Lymph # 1.2 1.5 1.5   1.5     Lymph% 21.1 27.0 24.1   18.8     Magnesium   2.1           MCH 23.6 22.9 23.0   23.4     MCHC 30.5 29.4 29.8   30.5     MCV 77 78 77   77     Mono # 0.4 0.4 0.5   0.6     Mono% 7.0 7.8 7.4   6.8     MPV 9.8 9.4 9.1   9.4     nRBC 0 0 0   0     Phosphorus   3.1              3.1           Platelets 200 225 231   256     POC Glucose       144       Potassium   3.8           PT   13.5           RBC 3.69 3.63 3.69   3.98     RDW 16.9 16.5 16.5   16.5     Sodium   137           WBC 5.55 5.66 6.39   8.18                          Significant Imaging:     Ultrasound Lower extremity arteries, Right:  Impression:       Negative for recurrent right groin pseudoaneurysm.     12/10/19 CTA Abd/pelvis:  Impression:       16 x 12 mm pseudoaneurysm within the right groin with moderate subcutaneous edema in the right inguinal region extending into the right labia in upper  thigh    Normal CTA runoff    Fatty infiltration of the liver      12/10/19 RLE us:  Impression:       1. Negative for pseudoaneurysm.  The previous patent pseudoaneurysm has resolved.  2. Small hematoma along deep aspect of subcutaneous fat.      12/9/19 RLE us:  Impression:       Small patent pseudoaneurysm superficial to right common femoral artery and vein.       Assessment and Plan:     1. Atherosclerotic coronary artery disease-multi-vessel disease  2. S/p LHC   3. Post procedure hematoma/ pseudoaneurysm - right groin  4. HFpEF  5. HTN  6. Obesity  7. Type 2 DM  8. CRISPIN    PLAN:    1. US of RLE arteries is negative for recurrent pseudoaneurysm. Groin site remains with expected bruising.   2. Patient is cleared to ambulate and dc home later today.   3. Recommend Tylenol PRN for right groin discomfort. Will also suggest low dose tramadol PRN for a short time.   4. Patient to see Dr. Mando Mckeon on Friday of this week for evaluation of multi-vessel disease.   5. Patient may resume Plavix 75 mg po daily and aspirin 81 mg po daily starting Friday 12/13/19.       Active Diagnoses:    Diagnosis Date Noted POA    PRINCIPAL PROBLEM:  Pseudoaneurysm following procedure [T81.718A, I72.9] 12/10/2019 Yes    Atherosclerosis of native coronary artery of native heart with unstable angina pectoris [I25.110] 12/09/2019 Yes    Hematoma [T14.8XXA] 12/09/2019 Yes    Sleep apnea [G47.30] 12/09/2019 Yes    DM (diabetes mellitus), type 2 [E11.9] 11/30/2019 Yes    Hypertension [I10] 11/30/2019 Yes      Problems Resolved During this Admission:       VTE Risk Mitigation (From admission, onward)         Ordered     Place THOMAS hose  Until discontinued      12/09/19 1713     Place sequential compression device  Until discontinued      12/09/19 1713     Reason for No Pharmacological VTE Prophylaxis  Once     Question:  Reasons:  Answer:  Active Bleeding    12/09/19 2831     IP VTE HIGH RISK PATIENT  Once      12/09/19 1834                 Marisol Elam, KENNETH  Cardiology  [facility]

## 2019-12-13 ENCOUNTER — INITIAL CONSULT (OUTPATIENT)
Dept: CARDIOLOGY | Facility: CLINIC | Age: 39
End: 2019-12-13
Payer: COMMERCIAL

## 2019-12-13 VITALS
DIASTOLIC BLOOD PRESSURE: 81 MMHG | BODY MASS INDEX: 49.74 KG/M2 | HEART RATE: 75 BPM | WEIGHT: 270.31 LBS | SYSTOLIC BLOOD PRESSURE: 145 MMHG | OXYGEN SATURATION: 98 % | HEIGHT: 62 IN

## 2019-12-13 DIAGNOSIS — I10 ESSENTIAL HYPERTENSION: ICD-10-CM

## 2019-12-13 DIAGNOSIS — T81.718A PSEUDOANEURYSM FOLLOWING PROCEDURE: ICD-10-CM

## 2019-12-13 DIAGNOSIS — E78.5 DYSLIPIDEMIA: ICD-10-CM

## 2019-12-13 DIAGNOSIS — G47.30 SLEEP APNEA, UNSPECIFIED TYPE: ICD-10-CM

## 2019-12-13 DIAGNOSIS — I25.118 ATHEROSCLEROSIS OF NATIVE CORONARY ARTERY OF NATIVE HEART WITH STABLE ANGINA PECTORIS: Primary | ICD-10-CM

## 2019-12-13 DIAGNOSIS — I72.9 PSEUDOANEURYSM FOLLOWING PROCEDURE: ICD-10-CM

## 2019-12-13 DIAGNOSIS — E11.9 TYPE 2 DIABETES MELLITUS WITHOUT COMPLICATION, WITHOUT LONG-TERM CURRENT USE OF INSULIN: ICD-10-CM

## 2019-12-13 PROBLEM — E78.00 PURE HYPERCHOLESTEROLEMIA: Chronic | Status: ACTIVE | Noted: 2019-12-13

## 2019-12-13 PROBLEM — I20.89 STABLE ANGINA: Status: RESOLVED | Noted: 2019-11-30 | Resolved: 2019-12-13

## 2019-12-13 PROCEDURE — 99205 OFFICE O/P NEW HI 60 MIN: CPT | Mod: S$GLB,,, | Performed by: INTERNAL MEDICINE

## 2019-12-13 PROCEDURE — 99205 PR OFFICE/OUTPT VISIT, NEW, LEVL V, 60-74 MIN: ICD-10-PCS | Mod: S$GLB,,, | Performed by: INTERNAL MEDICINE

## 2019-12-13 PROCEDURE — 99999 PR PBB SHADOW E&M-EST. PATIENT-LVL III: ICD-10-PCS | Mod: PBBFAC,,, | Performed by: INTERNAL MEDICINE

## 2019-12-13 PROCEDURE — 99999 PR PBB SHADOW E&M-EST. PATIENT-LVL III: CPT | Mod: PBBFAC,,, | Performed by: INTERNAL MEDICINE

## 2019-12-13 RX ORDER — METOPROLOL SUCCINATE 50 MG/1
50 TABLET, EXTENDED RELEASE ORAL DAILY
Qty: 30 TABLET | Refills: 11 | Status: SHIPPED | OUTPATIENT
Start: 2019-12-13 | End: 2020-12-17 | Stop reason: SDUPTHER

## 2019-12-13 RX ORDER — ATORVASTATIN CALCIUM 80 MG/1
80 TABLET, FILM COATED ORAL DAILY
Qty: 90 TABLET | Refills: 3 | Status: SHIPPED | OUTPATIENT
Start: 2019-12-13 | End: 2022-08-08

## 2019-12-13 NOTE — LETTER
December 13, 2019      Lenard Corona MD  [address]  [address]  [place] [facility] [address]           [facility]  [address]  [facility] [address]  Phone: [phone]          Patient: Marilyn Madera   MR Number: [id number]   YOB: 1980   Date of Visit: 12/13/2019       Dear Dr. Lenard Corona:    Thank you for referring Marilyn Madera to me for evaluation. Attached you will find relevant portions of my assessment and plan of care.    If you have questions, please do not hesitate to call me. I look forward to following Marilyn Madera along with you.    Sincerely,    Mando Mckeon MD    Enclosure  CC:  No Recipients    If you would like to receive this communication electronically, please contact [email or web address] or [phone] to request more information on [employer] Link access.    For providers and/or their staff who would like to refer a patient to [facility], please contact us through our one-stop-shop provider referral line, [facility], at [phone].    If you feel you have received this communication in error or would no longer like to receive these types of communications, please e-mail [email or web address]

## 2019-12-13 NOTE — DISCHARGE SUMMARY
[facility] Medicine  Discharge Summary      Patient Name: Marilyn Madera  MRN: [id number]  Admission Date: 12/9/2019  Hospital Length of Stay: 0 days  Discharge Date and Time: 12/11/2019  2:17 PM  Attending Physician: No att. providers found   Discharging Provider: Kike Woodruff MD  Primary Care Provider: RAGINI Chiang      HPI:   39-year-old lady with past medical history of hypertension, diabetes mellitus, obstructive sleep apnea on CPAP came for elective coronary angiogram.  After angiogram she was doing fine and right groin sheath was removed however later while using bedpan she strained and developed right groin hematoma.  Pressure was applied for extended period and hemostasis was achieved. Dr. SHAHZAD Corona contacted hospital medicine service to admit her for overnight observation.  Upon my assessment patient reports some right groin discomfort otherwise denies any chest pain, headache, dizziness, fever, chills, nausea, vomiting, bladder or bowel symptoms.  She is not on any cardiac trips.  She tells me that she was suffering from on and off chest pain and had positive stress test which led to this angiogram.     Past medical history:  As mentioned above  Family history:  Father has diabetes and hypertension, mother has hypertension  Social history:  Approximately 20 pack year smoking history, recently cut down to half pack every day, denies any alcohol recreational drug use  Allergies:  Morphine and codeine  Surgical history:  Cholecystectomy, tubal ligation    Procedure(s) (LRB):  CATHETERIZATION, HEART, LEFT (Left)      Hospital Course:   12/10:  Overall patient feels same.  Right groin hematoma looks little worse today with more subcutaneous edema extending up to her labia. H&H is stable. Hemodynamically stable.  Imaging revealed right groin hematoma and pseudoaneurysm. Vascular surgery is consulted. She underwent tPA injection.     12/11: Repeat USG of right groin without  recurrent pseudoaneurysm. Pain improved. Deemed stable for discharge to home. Pain medications ordered at the time of discharge.     Instructions provided to follow up with primary care physician as outpatient. Patient verbalized understanding and is aware to contact primary care physician or return to ED if new or worsening symptoms.    Physical exam on the day of discharge:  General: Patient resting comfortably in no acute distress.  Lungs: CTA. Good air entry.  Cor: Regular rate and rhythm. No murmurs. No pedal edema.  Abd: Soft. Nontender. Non-distended.  Neuro: A&O x3. Moving all 4 extremities equally  Ext: No clubbing. No cyanosis. Right groin with bruising and mild swelling . No appreciable hematoma           Consults:   Consults (From admission, onward)        Status Ordering Provider     Inpatient consult to Social Work/Case Management  Once     Provider:  (Not yet assigned)    Completed DM CLARK     Inpatient consult to Vascular Surgery  Once     Provider:  Ali Khoobehi, MD    Completed JOEL SCHAEFER V.MARIA ELENA          No new Assessment & Plan notes have been filed under this hospital service since the last note was generated.  Service: Hospital Medicine    Final Active Diagnoses:    Diagnosis Date Noted POA    Atherosclerosis of native coronary artery of native heart with unstable angina pectoris [I25.110] 12/09/2019 Yes    Sleep apnea [G47.30] 12/09/2019 Yes    DM (diabetes mellitus), type 2 [E11.9] 11/30/2019 Yes    Hypertension [I10] 11/30/2019 Yes      Problems Resolved During this Admission:    Diagnosis Date Noted Date Resolved POA    PRINCIPAL PROBLEM:  Pseudoaneurysm following procedure [T81.718A, I72.9] 12/10/2019 12/11/2019 Yes    Hematoma [T14.8XXA] 12/09/2019 12/11/2019 Yes       Discharged Condition: fair    Disposition: Home or Self Care    Follow Up:  Follow-up Information     RAGINI Chiang In 2 weeks.    Specialty:  Family Medicine  Why:  As needed  Contact  information:  [address]  [address]  [facility]  [place] [address]  [phone]             Lenard Corona MD In 2 weeks.    Specialties:  Cardiovascular Disease, Cardiology  Why:  As needed - CAD (Coronary artery disease)  Contact information:  [address]  [address]  [place] [address]  [phone]                 Patient Instructions:      US Lower Extremity Arteries Right   Standing Status: Future Standing Exp. Date: 12/09/20     Order Specific Question Answer Comments   Reason for Exam: rule out pseudo and hematoma    Is the patient pregnant? No      US Lower Extremity Arteries Right   Standing Status: Future Standing Exp. Date: 12/09/20     Order Specific Question Answer Comments   Reason for Exam: rule out pseudoaneurysm and hematoma    Is the patient pregnant? No      Pregnancy, urine rapid   Order Comments: Specimen Source->Urine     Order Specific Question Answer Comments   Specimen Source Urine      Diet Cardiac     Notify your health care provider if you experience any of the following:  temperature >100.4     Notify your health care provider if you experience any of the following:  persistent nausea and vomiting or diarrhea     Notify your health care provider if you experience any of the following:  persistent dizziness, light-headedness, or visual disturbances     Activity as tolerated       Significant Diagnostic Studies: Labs:   CMP   Recent Labs   Lab 12/11/19  0344      K 3.8      CO2 25   *   BUN 19   CREATININE 0.8   CALCIUM 8.0*   ALBUMIN 3.1*   ANIONGAP 7*   ESTGFRAFRICA >60.0   EGFRNONAA >60.0    and CBC   Recent Labs   Lab 12/11/19  0344 12/11/19  0827   WBC 5.66 5.55   HGB 8.3* 8.7*   HCT 28.2* 28.5*    200       Pending Diagnostic Studies:     Procedure Component Value Units Date/Time    EKG 12-LEAD [[id number]]     Order Status:  Sent Lab Status:  No result          Medications:  Reconciled Home Medications:      Medication List       START taking these medications    ondansetron 4 MG tablet  Commonly known as:  ZOFRAN  Take 1 tablet (4 mg total) by mouth every 6 (six) hours as needed for Nausea.     oxyCODONE-acetaminophen  mg per tablet  Commonly known as:  PERCOCET  Take 1 tablet by mouth every 6 (six) hours as needed for Pain.        CONTINUE taking these medications    amLODIPine 10 MG tablet  Commonly known as:  NORVASC  TAKE 1 TABLET BY MOUTH EVERY DAY FOR 90 DAYS     aspirin 81 MG EC tablet  Commonly known as:  ECOTRIN  Take 81 mg by mouth once daily.     atorvastatin 40 MG tablet  Commonly known as:  LIPITOR  Take 40 mg by mouth nightly.     clopidogrel 75 mg tablet  Commonly known as:  PLAVIX  Take 1 tablet by mouth once daily.     isosorbide mononitrate 30 MG 24 hr tablet  Commonly known as:  IMDUR  Take 1 tablet (30 mg total) by mouth once daily.     lisinopril-hydrochlorothiazide 20-12.5 mg per tablet  Commonly known as:  PRINZIDE,ZESTORETIC  lisinopril 20 mg-hydrochlorothiazide 12.5 mg tablet   TAKE TWO TABLETS BY MOUTH ONCE DAILY     metFORMIN 500 MG tablet  Commonly known as:  GLUCOPHAGE  Take 500 mg by mouth 2 (two) times daily.     metoprolol succinate 25 MG 24 hr tablet  Commonly known as:  TOPROL-XL  Take 25 mg by mouth nightly.     nitroGLYCERIN 0.4 MG SL tablet  Commonly known as:  NITROSTAT  Take 1 tablet by mouth every 5 (five) minutes as needed for Chest pain.            Indwelling Lines/Drains at time of discharge:   Lines/Drains/Airways     None                 Time spent on the discharge of patient: 38 minutes  Patient was seen and examined on the date of discharge and determined to be suitable for discharge.         Kike Woodruff MD  Department of Hospital Medicine  [facility]

## 2019-12-13 NOTE — PROGRESS NOTES
-Subjective:   Patient ID:  Marilyn Madera is a 39 y.o. female referred by Dr. Corona who presents for evaluation of chest pain.     Assessment:     1. Atherosclerosis of native coronary artery of native heart with stable angina pectoris    2. Essential hypertension : not controlled.   3. Sleep apnea, unspecified type    4. Type 2 diabetes mellitus without complication, without long-term current use of insulin    5. Pseudoaneurysm following procedure: R. CFA; resolved post-thrombin injection    6. Dyslipidemia        Plan:   39 year old female with exertional chest discomfort for more than a year, relieved by rest, presenting for evaluation of CAD and consideration of PCI. Reviewed angiogram preformed at [facility] by Dr. Corona. Symptoms are consistent with stable angina.     Discussed the role of PCI in SICD in relieving symptoms that are refractory to medical therapy.     Will further optimizing medical therapy.    - Continue Asa, Plavix  - Increase Lipitor to 80mg qhs ()  - Increased Toprol XL to 50mg daily (HR 75 today)  - Ordered PET Stress test to confirm ischemia with discrepancy between anterior SPECT defect and LCx target lesion.   - Will review symptoms and imaging on next visit. Consider PCI for symptoms despite medical therapy.   - The patient was counseled on the dangers of tobacco use.  Reviewed strategies to maximize success.   - Encouraged diet and exercise  - Check BP daily, bring BP log  - Educated on how to use PRN nitro.   - Continue Norvasc 10,  Lisinopril/HCTZ 20/12.5.     Mando Mckeon          HPI: Mrs. Madera is a 39 year old female referred for evaluation for high risk PCI by Dr. Lenard Corona. Admitted from 12/9-12/12 at [facility]. She describes having intermittent exertional chest pain for about 1 year that is relieved with rest. She notes they are not predictable when she will have them. Her symptoms are described as a chest tighness with jaw pain and associated shortness of  breath. Her symptoms are relieved with rest. The duration of her symptoms last anywhere from a couple of minutes to 45 minutes. She presented for an outpatient angiogram for stable angina and abnormal SPECT. The angiogram was significant for 3v CAD. Post procedure, she developed a right groin hematoma and pseudoaneurysm of right CFA 2.5x 2.0x 1.3. The lesion was injected with thrombin by vascular surgery. Presents today for consultation for PCI. Chest pain free for the past 1 week. She stopped smoking a couple of days ago.      She had an admission at [facility] 05/2019 for hypertensive  Emergency. She was treated with a Cardene drip. She was supposed to be on Lisinopril 20/HCTZ 25 at that time but she was not taking these medications. Her lisinopril was increased to 40 mg daily and added Nifedipine 30 mg. She is no longer taking Nifedipine. She was also prescribed Imdur at one point but is also now not taking this medication.      Coronary Angiogram review:  LM: nl  LAD: prox-mid tubular 50%, distal diffuse 70%.  RAMUS: Ostial >70%, mid >70%  LCX: distal 90%  RCA: 50% narrowing of proximal PDA  Normal EF and wall motion.        TTE 5/6/19  1.Study quality: Technically difficult                                                                                                  2.No hemodynamically significant valvular disease                                                                                          3.The estimated LV ejection fraction is 65%                                                                                             4.There is mild to moderate concentric LV hypertrophy                                                                                    5.Normal left atrial pressure with Grade I diastolic dysfunction                                                                             6.The estimated RV systolic pressure is normal                                                                                      7.There are no prior studies for comparison           19  SPECT [facility]   1. Slightly diminished tracer accumulation at the anterior wall on  exercise stress images. While findings could reflect variable soft  tissue attenuation artifact, mild anterior wall reversible ischemia is  also a differential possibility. Correlate with any stress-induced EKG  changes.  2. Estimated ejection fraction is 37%.        Review of Systems   Constitution: Negative for chills.   HENT: Negative for congestion.    Eyes: Negative for blurred vision.   Cardiovascular: Positive for chest pain. Negative for leg swelling, near-syncope, palpitations and syncope.   Respiratory: Positive for shortness of breath. Negative for cough.    Endocrine: Negative for polyuria.   Skin: Negative for itching and rash.   Musculoskeletal: Negative for back pain.   Gastrointestinal: Negative for abdominal pain, constipation, diarrhea, nausea and vomiting.   Genitourinary: Negative for dysuria.   Neurological: Negative for dizziness, headaches and light-headedness.   Psychiatric/Behavioral: Negative for altered mental status.       Past Medical History:   Diagnosis Date    CHF (congestive heart failure)     Diabetes mellitus     Hypertension        Past Surgical History:   Procedure Laterality Date    CARDIAC CATHETERIZATION  2019    CHOLECYSTECTOMY      LEFT HEART CATHETERIZATION Left 2019    Procedure: CATHETERIZATION, HEART, LEFT;  Surgeon: Lenard Corona MD;  Location: [facility] CATH/EP LAB;  Service: Cardiology;  Laterality: Left;    TUBAL LIGATION         Social History     Tobacco Use    Smoking status: Former Smoker     Packs/day: 0.25     Last attempt to quit: 2019     Years since quittin.0    Smokeless tobacco: Never Used    Tobacco comment: 3 - 4 cigs a day   Substance Use Topics    Alcohol use: Not Currently     Comment: social    Drug use: Never  "      Family History   Problem Relation Age of Onset    Diabetes Father     Hypertension Father     Hypertension Mother     Heart disease Paternal Grandmother        Current Outpatient Medications   Medication Sig    amLODIPine (NORVASC) 10 MG tablet TAKE 1 TABLET BY MOUTH EVERY DAY FOR 90 DAYS    aspirin (ECOTRIN) 81 MG EC tablet Take 81 mg by mouth once daily.    clopidogrel (PLAVIX) 75 mg tablet Take 1 tablet by mouth once daily.    lisinopril-hydrochlorothiazide (PRINZIDE,ZESTORETIC) 20-12.5 mg per tablet Take 1 tablet by mouth once daily.     metFORMIN (GLUCOPHAGE) 500 MG tablet Take 500 mg by mouth 2 (two) times daily.    ondansetron (ZOFRAN) 4 MG tablet Take 1 tablet (4 mg total) by mouth every 6 (six) hours as needed for Nausea.    oxyCODONE-acetaminophen (PERCOCET)  mg per tablet Take 1 tablet by mouth every 6 (six) hours as needed for Pain.    atorvastatin (LIPITOR) 80 MG tablet Take 1 tablet (80 mg total) by mouth once daily.    metoprolol succinate (TOPROL-XL) 50 MG 24 hr tablet Take 1 tablet (50 mg total) by mouth once daily.    nitroGLYCERIN (NITROSTAT) 0.4 MG SL tablet Take 1 tablet by mouth every 5 (five) minutes as needed for Chest pain.     No current facility-administered medications for this visit.        Review of patient's allergies indicates:   Allergen Reactions    Codeine Nausea Only and Nausea And Vomiting    Morphine Anaphylaxis and Rash       Objective:     BP (!) 145/81 (BP Location: Left arm, Patient Position: Sitting, BP Method: X-Large (Automatic))   Pulse 75   Ht 5' 2" (1.575 m)   Wt 122.6 kg (270 lb 4.5 oz)   LMP 11/15/2019   SpO2 98%   BMI 49.44 kg/m²     Physical Exam   Constitutional: She is oriented to person, place, and time. She appears well-developed and well-nourished.   HENT:   Head: Normocephalic and atraumatic.   Eyes: Pupils are equal, round, and reactive to light. Conjunctivae are normal.   Neck: Normal range of motion. Neck supple. "   Cardiovascular: Normal rate, regular rhythm, S1 normal, S2 normal and normal heart sounds. Exam reveals no gallop and no friction rub.   No murmur heard.  Pulses:       Radial pulses are 2+ on the right side, and 2+ on the left side.   Pulmonary/Chest: Effort normal and breath sounds normal. No respiratory distress. She has no wheezes. She has no rales. She exhibits no tenderness.   Abdominal: Soft. Bowel sounds are normal. She exhibits no distension and no mass. There is no tenderness. There is no rebound and no guarding.   Musculoskeletal: She exhibits no edema.   Neurological: She is alert and oriented to person, place, and time.   Skin: Skin is warm and dry.   Bruising, palpable hematoma to right groin. TTP   Psychiatric: She has a normal mood and affect.         Chemistry        Component Value Date/Time     12/11/2019 0344    K 3.8 12/11/2019 0344     12/11/2019 0344    CO2 25 12/11/2019 0344    BUN 19 12/11/2019 0344    CREATININE 0.8 12/11/2019 0344     (H) 12/11/2019 0344        Component Value Date/Time    CALCIUM 8.0 (L) 12/11/2019 0344    ALKPHOS 101 11/30/2019 0325    AST 24 11/30/2019 0325    ALT 28 11/30/2019 0325    BILITOT 0.4 11/30/2019 0325    ESTGFRAFRICA >60.0 12/11/2019 0344    EGFRNONAA >60.0 12/11/2019 0344            Lab Results   Component Value Date    CHOL 192 12/06/2019     Lab Results   Component Value Date    HDL 26 (L) 12/06/2019     Lab Results   Component Value Date    LDLCALC 114.0 12/06/2019     Lab Results   Component Value Date    TRIG 260 (H) 12/06/2019     Lab Results   Component Value Date    CHOLHDL 13.5 (L) 12/06/2019         Lab Results   Component Value Date     12/11/2019    K 3.8 12/11/2019     12/11/2019    CO2 25 12/11/2019    BUN 19 12/11/2019    CREATININE 0.8 12/11/2019     (H) 12/11/2019    HGBA1C 7.3 (H) 12/06/2019    MG 2.1 12/11/2019    AST 24 11/30/2019    ALT 28 11/30/2019    ALBUMIN 3.1 (L) 12/11/2019    PROT 7.3  11/30/2019    BILITOT 0.4 11/30/2019    WBC 5.55 12/11/2019    HGB 8.7 (L) 12/11/2019    HCT 28.5 (L) 12/11/2019    MCV 77 (L) 12/11/2019     12/11/2019    INR 1.1 12/11/2019    CHOL 192 12/06/2019    HDL 26 (L) 12/06/2019    LDLCALC 114.0 12/06/2019    TRIG 260 (H) 12/06/2019

## 2019-12-23 DIAGNOSIS — I72.4 ANEURYSM OF ARTERY OF LOWER EXTREMITY: Primary | ICD-10-CM

## 2019-12-23 DIAGNOSIS — I72.4 ANEURYSM OF ARTERY OF LOWER EXTREMITY: ICD-10-CM

## 2019-12-23 DIAGNOSIS — M79.651 RIGHT THIGH PAIN: Primary | ICD-10-CM

## 2019-12-23 DIAGNOSIS — M79.651 RIGHT THIGH PAIN: ICD-10-CM

## 2019-12-23 LAB — CATH EF QUANTITATIVE: 60 %

## 2019-12-26 ENCOUNTER — TELEPHONE (OUTPATIENT)
Dept: CARDIOLOGY | Facility: CLINIC | Age: 39
End: 2019-12-26

## 2019-12-27 ENCOUNTER — CLINICAL SUPPORT (OUTPATIENT)
Dept: CARDIOLOGY | Facility: CLINIC | Age: 39
End: 2019-12-27
Attending: INTERNAL MEDICINE
Payer: COMMERCIAL

## 2019-12-27 VITALS — BODY MASS INDEX: 49.69 KG/M2 | WEIGHT: 270 LBS | HEIGHT: 62 IN

## 2019-12-27 DIAGNOSIS — I25.118 ATHEROSCLEROSIS OF NATIVE CORONARY ARTERY OF NATIVE HEART WITH STABLE ANGINA PECTORIS: ICD-10-CM

## 2019-12-27 LAB
% MYOCARDIUM- DEFECT 1: 5 %
CFR FLOW - ANTERIOR: 1.8
CFR FLOW - INFERIOR: 1.87
CFR FLOW - LATERAL: 1.74
CFR FLOW - MAX: 2.75
CFR FLOW - MIN: 1.34
CFR FLOW - SEPTAL: 1.86
CFR FLOW - WHOLE HEART: 1.82
CFR FLOW- DEFECT 1: 1.65 CC/MIN/G
CV PHARM DOSE: 60 MG
CV STRESS BASE HR: 59 BPM
DIASTOLIC BLOOD PRESSURE: 86 MMHG
END DIASTOLIC INDEX-HIGH: 170 ML/M2
END SYSTOLIC INDEX-HIGH: 70 ML/M2
NUC REST DIASTOLIC VOLUME INDEX: 153
NUC REST EJECTION FRACTION: 84
NUC REST SYSTOLIC VOLUME INDEX: 25
NUC STRESS DIASTOLIC VOLUME INDEX: 169
NUC STRESS EJECTION FRACTION: 72 %
NUC STRESS SYSTOLIC VOLUME INDEX: 47
OHS CV CPX 85 PERCENT MAX PREDICTED HEART RATE MALE: 147
OHS CV CPX MAX PREDICTED HEART RATE: 173
OHS CV CPX PATIENT IS FEMALE: 1
OHS CV CPX PATIENT IS MALE: 0
OHS CV CPX PEAK DIASTOLIC BLOOD PRESSURE: 81 MMHG
OHS CV CPX PEAK HEAR RATE: 69 BPM
OHS CV CPX PEAK RATE PRESSURE PRODUCT: NORMAL
OHS CV CPX PEAK SYSTOLIC BLOOD PRESSURE: 159 MMHG
OHS CV CPX PERCENT MAX PREDICTED HEART RATE ACHIEVED: 40
OHS CV CPX RATE PRESSURE PRODUCT PRESENTING: 6195
PERFUSION DEFECT 1 SIZE IN %: 5 %
REST FLOW - ANTERIOR: 0.99 CC/MIN/G
REST FLOW - INFERIOR: 0.9 CC/MIN/G
REST FLOW - LATERAL: 0.8 CC/MIN/G
REST FLOW - MAX: 1.2 CC/MIN/G
REST FLOW - MIN: 0.6 CC/MIN/G
REST FLOW - SEPTAL: 0.93 CC/MIN/G
REST FLOW - WHOLE HEART: 0.91 CC/MIN/G
REST FLOW- DEFECT 1: 0.64 CC/MIN/G
RETIRED EF AND QEF - SEE NOTES: 51 %
STRESS ECHO TARGET HR: 156 BPM
STRESS FLOW - ANTERIOR: 1.75 CC/MIN/G
STRESS FLOW - INFERIOR: 1.66 CC/MIN/G
STRESS FLOW - LATERAL: 1.39 CC/MIN/G
STRESS FLOW - MAX: 2.1 CC/MIN/G
STRESS FLOW - MIN: 0.9 CC/MIN/G
STRESS FLOW - SEPTAL: 1.72 CC/MIN/G
STRESS FLOW - WHOLE HEART: 1.63 CC/MIN/G
STRESS FLOW- DEFECT 1: 1.06 CC/MIN/G
STRESS ST DEPRESSION: 1 MM
SYSTOLIC BLOOD PRESSURE: 105 MMHG

## 2019-12-27 PROCEDURE — 93015 CARDIAC PET SCAN STRESS (CUPID ONLY): ICD-10-PCS | Mod: S$GLB,,, | Performed by: INTERNAL MEDICINE

## 2019-12-27 PROCEDURE — A9555 RB82 RUBIDIUM: HCPCS | Mod: S$GLB,,, | Performed by: INTERNAL MEDICINE

## 2019-12-27 PROCEDURE — 93015 CV STRESS TEST SUPVJ I&R: CPT | Mod: S$GLB,,, | Performed by: INTERNAL MEDICINE

## 2019-12-27 PROCEDURE — 78492 CARDIAC PET SCAN STRESS (CUPID ONLY): ICD-10-PCS | Mod: S$GLB,,, | Performed by: INTERNAL MEDICINE

## 2019-12-27 PROCEDURE — 99999 PR PBB SHADOW E&M-EST. PATIENT-LVL II: CPT | Mod: PBBFAC,,,

## 2019-12-27 PROCEDURE — 99999 PR PBB SHADOW E&M-EST. PATIENT-LVL II: ICD-10-PCS | Mod: PBBFAC,,,

## 2019-12-27 PROCEDURE — 78492 MYOCRD IMG PET MLT RST&STRS: CPT | Mod: S$GLB,,, | Performed by: INTERNAL MEDICINE

## 2019-12-27 PROCEDURE — A9555 CARDIAC PET SCAN STRESS (CUPID ONLY): ICD-10-PCS | Mod: S$GLB,,, | Performed by: INTERNAL MEDICINE

## 2019-12-27 RX ORDER — AMINOPHYLLINE 25 MG/ML
75 INJECTION, SOLUTION INTRAVENOUS
Status: COMPLETED | OUTPATIENT
Start: 2019-12-27 | End: 2019-12-27

## 2019-12-27 RX ORDER — DIPYRIDAMOLE 5 MG/ML
60 INJECTION INTRAVENOUS
Status: COMPLETED | OUTPATIENT
Start: 2019-12-27 | End: 2019-12-27

## 2019-12-27 RX ADMIN — DIPYRIDAMOLE 60 MG: 5 INJECTION INTRAVENOUS at 10:12

## 2019-12-27 RX ADMIN — AMINOPHYLLINE 75 MG: 25 INJECTION, SOLUTION INTRAVENOUS at 10:12

## 2019-12-30 ENCOUNTER — HOSPITAL ENCOUNTER (OUTPATIENT)
Dept: RADIOLOGY | Facility: HOSPITAL | Age: 39
Discharge: HOME | End: 2019-12-30
Attending: INTERNAL MEDICINE
Payer: COMMERCIAL

## 2019-12-30 DIAGNOSIS — M79.651 RIGHT THIGH PAIN: ICD-10-CM

## 2019-12-30 DIAGNOSIS — I72.4 ANEURYSM OF ARTERY OF LOWER EXTREMITY: ICD-10-CM

## 2019-12-30 PROCEDURE — 76857 US EXAM PELVIC LIMITED: CPT | Mod: TC,PO

## 2019-12-30 NOTE — PROGRESS NOTES
Subjective:   Patient ID:  Marilyn Madera is a 39 y.o. female referred by Dr. Lenard Corona who presents for follow up of Coronary Artery Disease; Hypertension; Diabetes; and Dyslipidemia      Assessment:     1. Coronary artery disease of native artery of native heart with stable angina pectoris: improved exercise tolerance and reduced frequency and severity of effort angina    2. Type 2 diabetes mellitus without complication, without long-term current use of insulin    3. Essential hypertension    4. Dyslipidemia    5. Sleep apnea, unspecified type        Plan:   DAPT for 1 year for ACS which is now stable effort angina.  Suggest lipid panel in 4 weeks, add ezetimibe if LDL >70.  Reinforced continuing smoking cessation  Reinforced continuing exercise and dietary compliance  Continue medical optimization with Dr. Corona        HPI: We are seeing this 38yo obese female for abnormal PET stress due to year-long exertional angina, the SPECT prior had shown mild anterior defect (vs breast artifact) and angiogram (complicated by post-procedure pseudo-aneurysm s/p thrombin injection) had shown 70% ostial and mid ramus lesions and a distal 90% Lcx stenosis. Patient has history of  diastolic HF, DM, HTN, recent smoker (quit 12/19). Initially referred by Dr. Corona for high risk PCI. LDL is 105.    She reports chest tightness and jaw pain with activity (15 minutes of continuous walking). Since optimizing her medication regimen she has felt more energy, but has similar exertional limitations. She has taken all of her medications, her blood pressures have been 109-149 SBP and 59 to 82 DBP (BID measurements). She continues to abstain from smoking, is improving her diet with low salt. She has started exercising on the treadmill, works with a , and is slowly improving her exertional capacity from getting her symptoms at 3-4 minutes to 5-10 minutes.         12- PET Stress    There is a very small sized,  mild intensity basal to mid lateral stress induced perfusion abnormality involving 5% of the LV myocardium in the distribution of the distal LCX indicative of subendocardial ischemia    Whole heart absolute myocardial perfusion (cc/min/g) averaged 0.91 cc/min/g at rest (which is normal), 1.63 cc/min/g at stress (which is mildly reduced), and 1.82 CFR (which is mildly reduced).    Gated perfusion images showed an ejection fraction of 84% at rest and 72% during stress. Normal ejection fraction is greater than 51%.    Wall motion was normal at rest and during stress.    LV cavity size is normal at rest and stress.    The EKG portion of this study is positive for ischemia.    There were no arrhythmias during stress.    The patient reported no chest pain during the stress test.    There are no prior studies for comparison.    There is no ischemia in the LAD or ramus territories.     Coronary Angiogram review:  LM: nl  LAD: prox-mid tubular 50%, distal diffuse 70%.  RAMUS: Ostial >70%, mid >70%  LCX: distal 90%  RCA: 50% narrowing of proximal PDA  Normal EF and wall motion.    Review of Systems   Constitution: Negative for chills, fever and weight gain.   HENT: Negative for congestion.    Eyes: Negative for visual disturbance.   Cardiovascular: Positive for chest pain (with jaw pain on exertion). Negative for claudication, dyspnea on exertion, leg swelling, orthopnea, palpitations and syncope.   Respiratory: Negative for cough, shortness of breath and snoring.    Hematologic/Lymphatic: Does not bruise/bleed easily.   Skin: Negative for rash.   Musculoskeletal: Negative for muscle cramps and myalgias.   Gastrointestinal: Negative for bloating, abdominal pain, constipation, diarrhea and melena.   Genitourinary: Negative for bladder incontinence.   Neurological: Negative for excessive daytime sleepiness, focal weakness and weakness.   Psychiatric/Behavioral: Negative for depression and suicidal ideas.        Past Medical History:   Diagnosis Date    CHF (congestive heart failure)     Diabetes mellitus     Hypertension        Past Surgical History:   Procedure Laterality Date    CARDIAC CATHETERIZATION  2019    CHOLECYSTECTOMY      LEFT HEART CATHETERIZATION Left 2019    Procedure: CATHETERIZATION, HEART, LEFT;  Surgeon: Lenard Corona MD;  Location: [facility] CATH/EP LAB;  Service: Cardiology;  Laterality: Left;    TUBAL LIGATION         Social History     Tobacco Use    Smoking status: Former Smoker     Packs/day: 0.25     Last attempt to quit: 2019     Years since quittin.0    Smokeless tobacco: Never Used    Tobacco comment: 3 - 4 cigs a day   Substance Use Topics    Alcohol use: Not Currently     Comment: social    Drug use: Never       Family History   Problem Relation Age of Onset    Diabetes Father     Hypertension Father     Hypertension Mother     Heart disease Paternal Grandmother        Current Outpatient Medications   Medication Sig    amLODIPine (NORVASC) 10 MG tablet TAKE 1 TABLET BY MOUTH EVERY DAY FOR 90 DAYS    aspirin (ECOTRIN) 81 MG EC tablet Take 81 mg by mouth once daily.    atorvastatin (LIPITOR) 80 MG tablet Take 1 tablet (80 mg total) by mouth once daily.    clopidogrel (PLAVIX) 75 mg tablet Take 1 tablet by mouth once daily.    lisinopril-hydrochlorothiazide (PRINZIDE,ZESTORETIC) 20-12.5 mg per tablet Take 1 tablet by mouth once daily.     metFORMIN (GLUCOPHAGE) 500 MG tablet Take 500 mg by mouth 2 (two) times daily.    metoprolol succinate (TOPROL-XL) 50 MG 24 hr tablet Take 1 tablet (50 mg total) by mouth once daily.    nitroGLYCERIN (NITROSTAT) 0.4 MG SL tablet Take 1 tablet by mouth every 5 (five) minutes as needed for Chest pain.     No current facility-administered medications for this visit.        Review of patient's allergies indicates:   Allergen Reactions    Codeine Nausea Only and Nausea And Vomiting    Morphine Anaphylaxis and Rash  "      Objective:     /61 (BP Location: Left arm, Patient Position: Sitting, BP Method: Large (Automatic))   Pulse 72   Ht 5' 2" (1.575 m)   Wt 121.8 kg (268 lb 8.3 oz)   SpO2 97%   BMI 49.11 kg/m²     Physical Exam   Constitutional: She is oriented to person, place, and time. She appears well-developed and well-nourished. No distress.   Obese   HENT:   Head: Normocephalic and atraumatic.   Mouth/Throat: Oropharynx is clear and moist.   Eyes: Pupils are equal, round, and reactive to light. Conjunctivae and EOM are normal. No scleral icterus.   Neck: Normal range of motion. Neck supple. No JVD present.   Cardiovascular: Normal rate, regular rhythm, normal heart sounds and intact distal pulses.   No murmur heard.  Pulses:       Radial pulses are 2+ on the right side, and 2+ on the left side.   Pulmonary/Chest: Effort normal and breath sounds normal. No respiratory distress.   Symmetrical expansion   Abdominal: Soft. Bowel sounds are normal. There is no hepatosplenomegaly. There is no tenderness.   Musculoskeletal: Normal range of motion. She exhibits no edema.   Right groin mildly tender with 3mm x 3mm non-pulsatile mass consistent with healing hematoma.   Neurological: She is alert and oriented to person, place, and time. No cranial nerve deficit.   Skin: Skin is warm and dry. No rash noted. She is not diaphoretic.   Psychiatric: She has a normal mood and affect. Judgment and thought content normal.         Chemistry        Component Value Date/Time     12/11/2019 0344    K 3.8 12/11/2019 0344     12/11/2019 0344    CO2 25 12/11/2019 0344    BUN 19 12/11/2019 0344    CREATININE 0.8 12/11/2019 0344     (H) 12/11/2019 0344        Component Value Date/Time    CALCIUM 8.0 (L) 12/11/2019 0344    ALKPHOS 101 11/30/2019 0325    AST 24 11/30/2019 0325    ALT 28 11/30/2019 0325    BILITOT 0.4 11/30/2019 0325    ESTGFRAFRICA >60.0 12/11/2019 0344    EGFRNONAA >60.0 12/11/2019 0344            Lab " Results   Component Value Date    CHOL 192 12/06/2019     Lab Results   Component Value Date    HDL 26 (L) 12/06/2019     Lab Results   Component Value Date    LDLCALC 114.0 12/06/2019     Lab Results   Component Value Date    TRIG 260 (H) 12/06/2019     Lab Results   Component Value Date    CHOLHDL 13.5 (L) 12/06/2019         Lab Results   Component Value Date     12/11/2019    K 3.8 12/11/2019     12/11/2019    CO2 25 12/11/2019    BUN 19 12/11/2019    CREATININE 0.8 12/11/2019     (H) 12/11/2019    HGBA1C 7.3 (H) 12/06/2019    MG 2.1 12/11/2019    AST 24 11/30/2019    ALT 28 11/30/2019    ALBUMIN 3.1 (L) 12/11/2019    PROT 7.3 11/30/2019    BILITOT 0.4 11/30/2019    WBC 5.55 12/11/2019    HGB 8.7 (L) 12/11/2019    HCT 28.5 (L) 12/11/2019    MCV 77 (L) 12/11/2019     12/11/2019    INR 1.1 12/11/2019    CHOL 192 12/06/2019    HDL 26 (L) 12/06/2019    LDLCALC 114.0 12/06/2019    TRIG 260 (H) 12/06/2019

## 2020-01-03 ENCOUNTER — OFFICE VISIT (OUTPATIENT)
Dept: CARDIOLOGY | Facility: CLINIC | Age: 40
End: 2020-01-03
Payer: COMMERCIAL

## 2020-01-03 VITALS
HEART RATE: 72 BPM | BODY MASS INDEX: 49.41 KG/M2 | HEIGHT: 62 IN | OXYGEN SATURATION: 97 % | DIASTOLIC BLOOD PRESSURE: 61 MMHG | SYSTOLIC BLOOD PRESSURE: 132 MMHG | WEIGHT: 268.5 LBS

## 2020-01-03 DIAGNOSIS — I25.118 CORONARY ARTERY DISEASE OF NATIVE ARTERY OF NATIVE HEART WITH STABLE ANGINA PECTORIS: Primary | ICD-10-CM

## 2020-01-03 DIAGNOSIS — G47.30 SLEEP APNEA, UNSPECIFIED TYPE: ICD-10-CM

## 2020-01-03 DIAGNOSIS — E11.9 TYPE 2 DIABETES MELLITUS WITHOUT COMPLICATION, WITHOUT LONG-TERM CURRENT USE OF INSULIN: ICD-10-CM

## 2020-01-03 DIAGNOSIS — E78.5 DYSLIPIDEMIA: ICD-10-CM

## 2020-01-03 DIAGNOSIS — I10 ESSENTIAL HYPERTENSION: ICD-10-CM

## 2020-01-03 PROCEDURE — 99999 PR PBB SHADOW E&M-EST. PATIENT-LVL IV: CPT | Mod: PBBFAC,,, | Performed by: INTERNAL MEDICINE

## 2020-01-03 PROCEDURE — 3075F PR MOST RECENT SYSTOLIC BLOOD PRESS GE 130-139MM HG: ICD-10-PCS | Mod: CPTII,S$GLB,, | Performed by: INTERNAL MEDICINE

## 2020-01-03 PROCEDURE — 3008F BODY MASS INDEX DOCD: CPT | Mod: CPTII,S$GLB,, | Performed by: INTERNAL MEDICINE

## 2020-01-03 PROCEDURE — 99214 PR OFFICE/OUTPT VISIT, EST, LEVL IV, 30-39 MIN: ICD-10-PCS | Mod: S$GLB,,, | Performed by: INTERNAL MEDICINE

## 2020-01-03 PROCEDURE — 3075F SYST BP GE 130 - 139MM HG: CPT | Mod: CPTII,S$GLB,, | Performed by: INTERNAL MEDICINE

## 2020-01-03 PROCEDURE — 3008F PR BODY MASS INDEX (BMI) DOCUMENTED: ICD-10-PCS | Mod: CPTII,S$GLB,, | Performed by: INTERNAL MEDICINE

## 2020-01-03 PROCEDURE — 3078F DIAST BP <80 MM HG: CPT | Mod: CPTII,S$GLB,, | Performed by: INTERNAL MEDICINE

## 2020-01-03 PROCEDURE — 3078F PR MOST RECENT DIASTOLIC BLOOD PRESSURE < 80 MM HG: ICD-10-PCS | Mod: CPTII,S$GLB,, | Performed by: INTERNAL MEDICINE

## 2020-01-03 PROCEDURE — 99214 OFFICE O/P EST MOD 30 MIN: CPT | Mod: S$GLB,,, | Performed by: INTERNAL MEDICINE

## 2020-01-03 PROCEDURE — 99999 PR PBB SHADOW E&M-EST. PATIENT-LVL IV: ICD-10-PCS | Mod: PBBFAC,,, | Performed by: INTERNAL MEDICINE

## 2020-11-10 ENCOUNTER — TELEPHONE (OUTPATIENT)
Dept: CARDIOLOGY | Facility: CLINIC | Age: 40
End: 2020-11-10

## 2020-11-10 ENCOUNTER — HOSPITAL ENCOUNTER (OUTPATIENT)
Facility: HOSPITAL | Age: 40
Discharge: HOME | End: 2020-11-12
Attending: EMERGENCY MEDICINE | Admitting: FAMILY MEDICINE
Payer: COMMERCIAL

## 2020-11-10 DIAGNOSIS — R06.02 SHORTNESS OF BREATH: ICD-10-CM

## 2020-11-10 DIAGNOSIS — E11.00 TYPE 2 DIABETES MELLITUS WITH HYPEROSMOLARITY WITHOUT COMA, WITHOUT LONG-TERM CURRENT USE OF INSULIN: Chronic | ICD-10-CM

## 2020-11-10 DIAGNOSIS — I25.118 CORONARY ARTERY DISEASE OF NATIVE ARTERY OF NATIVE HEART WITH STABLE ANGINA PECTORIS: Chronic | ICD-10-CM

## 2020-11-10 DIAGNOSIS — I10 ESSENTIAL HYPERTENSION: Chronic | ICD-10-CM

## 2020-11-10 DIAGNOSIS — I20.89 STABLE ANGINA PECTORIS: ICD-10-CM

## 2020-11-10 DIAGNOSIS — I50.41 ACUTE COMBINED SYSTOLIC AND DIASTOLIC HEART FAILURE: ICD-10-CM

## 2020-11-10 DIAGNOSIS — R60.0 BILATERAL LOWER EXTREMITY EDEMA: Primary | ICD-10-CM

## 2020-11-10 DIAGNOSIS — R07.9 CHEST PAIN: ICD-10-CM

## 2020-11-10 PROBLEM — I87.2 VENOUS INSUFFICIENCY OF BOTH LOWER EXTREMITIES: Status: ACTIVE | Noted: 2020-11-10

## 2020-11-10 PROBLEM — G47.30 SLEEP APNEA: Chronic | Status: ACTIVE | Noted: 2019-12-09

## 2020-11-10 PROBLEM — E66.9 OBESITY: Status: ACTIVE | Noted: 2020-11-10

## 2020-11-10 PROBLEM — E11.9 DM (DIABETES MELLITUS), TYPE 2: Chronic | Status: ACTIVE | Noted: 2019-11-30

## 2020-11-10 PROBLEM — R73.9 HYPERGLYCEMIA: Status: ACTIVE | Noted: 2020-11-10

## 2020-11-10 PROBLEM — M79.89 SWELLING OF LOWER EXTREMITY: Status: ACTIVE | Noted: 2020-11-10

## 2020-11-10 PROBLEM — E78.5 DYSLIPIDEMIA: Chronic | Status: ACTIVE | Noted: 2019-12-13

## 2020-11-10 LAB
ALBUMIN SERPL BCP-MCNC: 3.8 G/DL (ref 3.5–5.2)
ALP SERPL-CCNC: 126 U/L (ref 55–135)
ALT SERPL W/O P-5'-P-CCNC: 22 U/L (ref 10–44)
ANION GAP SERPL CALC-SCNC: 12 MMOL/L (ref 8–16)
AST SERPL-CCNC: 17 U/L (ref 10–40)
BASOPHILS # BLD AUTO: 0.02 K/UL (ref 0–0.2)
BASOPHILS NFR BLD: 0.4 % (ref 0–1.9)
BILIRUB SERPL-MCNC: 0.4 MG/DL (ref 0.1–1)
BNP SERPL-MCNC: 71 PG/ML (ref 0–99)
BUN SERPL-MCNC: 15 MG/DL (ref 6–20)
CALCIUM SERPL-MCNC: 9.4 MG/DL (ref 8.7–10.5)
CHLORIDE SERPL-SCNC: 104 MMOL/L (ref 95–110)
CO2 SERPL-SCNC: 25 MMOL/L (ref 23–29)
CREAT SERPL-MCNC: 0.8 MG/DL (ref 0.5–1.4)
DIFFERENTIAL METHOD: ABNORMAL
EOSINOPHIL # BLD AUTO: 0.2 K/UL (ref 0–0.5)
EOSINOPHIL NFR BLD: 4.1 % (ref 0–8)
ERYTHROCYTE [DISTWIDTH] IN BLOOD BY AUTOMATED COUNT: 16.9 % (ref 11.5–14.5)
EST. GFR  (AFRICAN AMERICAN): >60 ML/MIN/1.73 M^2
EST. GFR  (NON AFRICAN AMERICAN): >60 ML/MIN/1.73 M^2
GLUCOSE SERPL-MCNC: 230 MG/DL (ref 70–110)
HCT VFR BLD AUTO: 35.9 % (ref 37–48.5)
HGB BLD-MCNC: 10.9 G/DL (ref 12–16)
IMM GRANULOCYTES # BLD AUTO: 0.05 K/UL (ref 0–0.04)
IMM GRANULOCYTES NFR BLD AUTO: 0.9 % (ref 0–0.5)
INR PPP: 1
LYMPHOCYTES # BLD AUTO: 1.3 K/UL (ref 1–4.8)
LYMPHOCYTES NFR BLD: 24.8 % (ref 18–48)
MCH RBC QN AUTO: 23.8 PG (ref 27–31)
MCHC RBC AUTO-ENTMCNC: 30.4 G/DL (ref 32–36)
MCV RBC AUTO: 78 FL (ref 82–98)
MONOCYTES # BLD AUTO: 0.3 K/UL (ref 0.3–1)
MONOCYTES NFR BLD: 5.6 % (ref 4–15)
NEUTROPHILS # BLD AUTO: 3.4 K/UL (ref 1.8–7.7)
NEUTROPHILS NFR BLD: 64.2 % (ref 38–73)
NRBC BLD-RTO: 0 /100 WBC
PLATELET # BLD AUTO: 306 K/UL (ref 150–350)
PMV BLD AUTO: 9.2 FL (ref 9.2–12.9)
POTASSIUM SERPL-SCNC: 3.7 MMOL/L (ref 3.5–5.1)
PROT SERPL-MCNC: 7.4 G/DL (ref 6–8.4)
PROTHROMBIN TIME: 13.1 SEC (ref 10.6–14.8)
RBC # BLD AUTO: 4.58 M/UL (ref 4–5.4)
SARS-COV-2 RDRP RESP QL NAA+PROBE: NEGATIVE
SODIUM SERPL-SCNC: 141 MMOL/L (ref 136–145)
TROPONIN I SERPL DL<=0.01 NG/ML-MCNC: <0.03 NG/ML
WBC # BLD AUTO: 5.32 K/UL (ref 3.9–12.7)

## 2020-11-10 PROCEDURE — G0378 HOSPITAL OBSERVATION PER HR: HCPCS

## 2020-11-10 PROCEDURE — 25000003 PHARM REV CODE 250: Performed by: STUDENT IN AN ORGANIZED HEALTH CARE EDUCATION/TRAINING PROGRAM

## 2020-11-10 PROCEDURE — 93010 ELECTROCARDIOGRAM REPORT: CPT | Mod: ,,, | Performed by: INTERNAL MEDICINE

## 2020-11-10 PROCEDURE — 63600175 PHARM REV CODE 636 W HCPCS: Performed by: STUDENT IN AN ORGANIZED HEALTH CARE EDUCATION/TRAINING PROGRAM

## 2020-11-10 PROCEDURE — 99285 EMERGENCY DEPT VISIT HI MDM: CPT | Mod: 25

## 2020-11-10 PROCEDURE — 93010 EKG 12-LEAD: ICD-10-PCS | Mod: ,,, | Performed by: INTERNAL MEDICINE

## 2020-11-10 PROCEDURE — 80053 COMPREHEN METABOLIC PANEL: CPT

## 2020-11-10 PROCEDURE — 93005 ELECTROCARDIOGRAM TRACING: CPT | Performed by: INTERNAL MEDICINE

## 2020-11-10 PROCEDURE — 83880 ASSAY OF NATRIURETIC PEPTIDE: CPT

## 2020-11-10 PROCEDURE — U0002 COVID-19 LAB TEST NON-CDC: HCPCS

## 2020-11-10 PROCEDURE — 25000242 PHARM REV CODE 250 ALT 637 W/ HCPCS: Performed by: STUDENT IN AN ORGANIZED HEALTH CARE EDUCATION/TRAINING PROGRAM

## 2020-11-10 PROCEDURE — 96375 TX/PRO/DX INJ NEW DRUG ADDON: CPT

## 2020-11-10 PROCEDURE — 84484 ASSAY OF TROPONIN QUANT: CPT

## 2020-11-10 PROCEDURE — 96374 THER/PROPH/DIAG INJ IV PUSH: CPT

## 2020-11-10 PROCEDURE — 85610 PROTHROMBIN TIME: CPT

## 2020-11-10 PROCEDURE — 85025 COMPLETE CBC W/AUTO DIFF WBC: CPT

## 2020-11-10 PROCEDURE — 36415 COLL VENOUS BLD VENIPUNCTURE: CPT

## 2020-11-10 RX ORDER — FUROSEMIDE 10 MG/ML
20 INJECTION INTRAMUSCULAR; INTRAVENOUS
Status: COMPLETED | OUTPATIENT
Start: 2020-11-10 | End: 2020-11-10

## 2020-11-10 RX ORDER — NAPROXEN SODIUM 220 MG/1
243 TABLET, FILM COATED ORAL
Status: COMPLETED | OUTPATIENT
Start: 2020-11-10 | End: 2020-11-10

## 2020-11-10 RX ORDER — ONDANSETRON 2 MG/ML
4 INJECTION INTRAMUSCULAR; INTRAVENOUS
Status: COMPLETED | OUTPATIENT
Start: 2020-11-10 | End: 2020-11-10

## 2020-11-10 RX ORDER — NITROGLYCERIN 0.4 MG/1
0.4 TABLET SUBLINGUAL EVERY 5 MIN PRN
Status: COMPLETED | OUTPATIENT
Start: 2020-11-10 | End: 2020-11-10

## 2020-11-10 RX ORDER — RANOLAZINE 500 MG/1
500 TABLET, EXTENDED RELEASE ORAL 2 TIMES DAILY
COMMUNITY
End: 2022-09-21 | Stop reason: CLARIF

## 2020-11-10 RX ORDER — OMEPRAZOLE 40 MG/1
40 CAPSULE, DELAYED RELEASE ORAL DAILY
COMMUNITY
End: 2022-09-21 | Stop reason: CLARIF

## 2020-11-10 RX ORDER — MEPERIDINE HYDROCHLORIDE 50 MG/ML
12.5 INJECTION INTRAMUSCULAR; INTRAVENOUS; SUBCUTANEOUS
Status: COMPLETED | OUTPATIENT
Start: 2020-11-10 | End: 2020-11-10

## 2020-11-10 RX ORDER — ACETAMINOPHEN 500 MG
1000 TABLET ORAL
Status: COMPLETED | OUTPATIENT
Start: 2020-11-10 | End: 2020-11-10

## 2020-11-10 RX ORDER — ISOSORBIDE MONONITRATE 60 MG/1
60 TABLET, EXTENDED RELEASE ORAL NIGHTLY
COMMUNITY
End: 2020-11-30

## 2020-11-10 RX ADMIN — NITROGLYCERIN 0.4 MG: 0.4 TABLET, ORALLY DISINTEGRATING SUBLINGUAL at 09:11

## 2020-11-10 RX ADMIN — FUROSEMIDE 20 MG: 10 INJECTION, SOLUTION INTRAMUSCULAR; INTRAVENOUS at 09:11

## 2020-11-10 RX ADMIN — MEPERIDINE HYDROCHLORIDE 12.5 MG: 50 INJECTION INTRAMUSCULAR; INTRAVENOUS; SUBCUTANEOUS at 09:11

## 2020-11-10 RX ADMIN — ONDANSETRON 4 MG: 2 INJECTION INTRAMUSCULAR; INTRAVENOUS at 10:11

## 2020-11-10 RX ADMIN — ASPIRIN 81 MG 243 MG: 81 TABLET ORAL at 09:11

## 2020-11-10 RX ADMIN — ACETAMINOPHEN 1000 MG: 500 TABLET, FILM COATED ORAL at 09:11

## 2020-11-10 NOTE — TELEPHONE ENCOUNTER
----- Message from Pearl So Patient Care Assistant sent at 11/10/2020 12:37 PM CST -----  Regarding: speak to nurse  Contact: patient  Type: Needs Medical Advice  Who Called:  patient  Symptoms (please be specific):  fluid , tight skin   How long has patient had these symptoms:  3 days  Pharmacy name and phone #:  na  Best Call Back Number: [phone]    Additional Information: patient states  her skin is so filled with fluid that if she bends down it may pop, she needs the nurse to give her a call . Please call . thanks

## 2020-11-11 ENCOUNTER — CLINICAL SUPPORT (OUTPATIENT)
Dept: CARDIOLOGY | Facility: HOSPITAL | Age: 40
End: 2020-11-11
Attending: FAMILY MEDICINE
Payer: COMMERCIAL

## 2020-11-11 VITALS — BODY MASS INDEX: 49.5 KG/M2 | HEIGHT: 62 IN | WEIGHT: 269 LBS

## 2020-11-11 LAB
ANION GAP SERPL CALC-SCNC: 12 MMOL/L (ref 8–16)
AORTIC ROOT ANNULUS: 2.66 CM
AORTIC VALVE CUSP SEPERATION: 2.15 CM
AV INDEX (PROSTH): 0.82
AV MEAN GRADIENT: 9 MMHG
AV PEAK GRADIENT: 16 MMHG
AV VALVE AREA: 3.82 CM2
AV VELOCITY RATIO: 74.8
BSA FOR ECHO PROCEDURE: 2.31 M2
BUN SERPL-MCNC: 17 MG/DL (ref 6–20)
CALCIUM SERPL-MCNC: 9.2 MG/DL (ref 8.7–10.5)
CHLORIDE SERPL-SCNC: 105 MMOL/L (ref 95–110)
CHOLEST SERPL-MCNC: 122 MG/DL (ref 120–199)
CHOLEST/HDLC SERPL: 6.1 {RATIO} (ref 2–5)
CO2 SERPL-SCNC: 23 MMOL/L (ref 23–29)
CREAT SERPL-MCNC: 0.7 MG/DL (ref 0.5–1.4)
CV ECHO LV RWT: 0.79 CM
DOP CALC AO PEAK VEL: 2 M/S
DOP CALC AO VTI: 44.91 CM
DOP CALC LVOT AREA: 4.6 CM2
DOP CALC LVOT DIAMETER: 2.43 CM
DOP CALC LVOT PEAK VEL: 149.6 M/S
DOP CALC LVOT STROKE VOLUME: 171.51 CM3
DOP CALCLVOT PEAK VEL VTI: 37 CM
E WAVE DECELERATION TIME: 286.76 MSEC
E/A RATIO: 0.89
E/E' RATIO: 13.08 M/S
ECHO LV POSTERIOR WALL: 1.59 CM (ref 0.6–1.1)
EST. GFR  (AFRICAN AMERICAN): >60 ML/MIN/1.73 M^2
EST. GFR  (NON AFRICAN AMERICAN): >60 ML/MIN/1.73 M^2
ESTIMATED AVG GLUCOSE: 194 MG/DL (ref 68–131)
FRACTIONAL SHORTENING: 41 % (ref 28–44)
GLUCOSE SERPL-MCNC: 192 MG/DL (ref 70–110)
GLUCOSE SERPL-MCNC: 201 MG/DL (ref 70–110)
GLUCOSE SERPL-MCNC: 205 MG/DL (ref 70–110)
GLUCOSE SERPL-MCNC: 209 MG/DL (ref 70–110)
GLUCOSE SERPL-MCNC: 231 MG/DL (ref 70–110)
HBA1C MFR BLD HPLC: 8.4 % (ref 4.5–6.2)
HDLC SERPL-MCNC: 20 MG/DL (ref 40–75)
HDLC SERPL: 16.4 % (ref 20–50)
INTERVENTRICULAR SEPTUM: 1.59 CM (ref 0.6–1.1)
IVRT: 160.23 MSEC
LDLC SERPL CALC-MCNC: 47.8 MG/DL (ref 63–159)
LEFT INTERNAL DIMENSION IN SYSTOLE: 2.37 CM (ref 2.1–4)
LEFT VENTRICLE MASS INDEX: 119 G/M2
LEFT VENTRICULAR INTERNAL DIMENSION IN DIASTOLE: 4.02 CM (ref 3.5–6)
LEFT VENTRICULAR MASS: 257.09 G
LV LATERAL E/E' RATIO: 21.25 M/S
LV SEPTAL E/E' RATIO: 9.44 M/S
MAGNESIUM SERPL-MCNC: 1.9 MG/DL (ref 1.6–2.6)
MV PEAK A VEL: 0.95 M/S
MV PEAK E VEL: 0.85 M/S
NONHDLC SERPL-MCNC: 102 MG/DL
PHOSPHATE SERPL-MCNC: 3.5 MG/DL (ref 2.7–4.5)
POTASSIUM SERPL-SCNC: 3.7 MMOL/L (ref 3.5–5.1)
PULM VEIN S/D RATIO: 1.17
PV PEAK D VEL: 26.73 M/S
PV PEAK S VEL: 31.29 M/S
PV PEAK VELOCITY: 94.11 CM/S
RA PRESSURE: 8 MMHG
RIGHT VENTRICULAR END-DIASTOLIC DIMENSION: 439 CM
SODIUM SERPL-SCNC: 140 MMOL/L (ref 136–145)
TDI LATERAL: 0.04 M/S
TDI SEPTAL: 0.09 M/S
TDI: 0.07 M/S
TRIGL SERPL-MCNC: 271 MG/DL (ref 30–150)
TROPONIN I SERPL DL<=0.01 NG/ML-MCNC: <0.03 NG/ML
TROPONIN I SERPL DL<=0.01 NG/ML-MCNC: <0.03 NG/ML

## 2020-11-11 PROCEDURE — 63600175 PHARM REV CODE 636 W HCPCS: Performed by: FAMILY MEDICINE

## 2020-11-11 PROCEDURE — 99900035 HC TECH TIME PER 15 MIN (STAT)

## 2020-11-11 PROCEDURE — 93306 TTE W/DOPPLER COMPLETE: CPT | Mod: 26,,, | Performed by: INTERNAL MEDICINE

## 2020-11-11 PROCEDURE — 94761 N-INVAS EAR/PLS OXIMETRY MLT: CPT

## 2020-11-11 PROCEDURE — 83036 HEMOGLOBIN GLYCOSYLATED A1C: CPT

## 2020-11-11 PROCEDURE — 84484 ASSAY OF TROPONIN QUANT: CPT | Mod: 91

## 2020-11-11 PROCEDURE — 36415 COLL VENOUS BLD VENIPUNCTURE: CPT

## 2020-11-11 PROCEDURE — 80061 LIPID PANEL: CPT

## 2020-11-11 PROCEDURE — 93306 TTE W/DOPPLER COMPLETE: CPT

## 2020-11-11 PROCEDURE — 80048 BASIC METABOLIC PNL TOTAL CA: CPT

## 2020-11-11 PROCEDURE — 96376 TX/PRO/DX INJ SAME DRUG ADON: CPT | Mod: 59

## 2020-11-11 PROCEDURE — 93306 ECHO (CUPID ONLY): ICD-10-PCS | Mod: 26,,, | Performed by: INTERNAL MEDICINE

## 2020-11-11 PROCEDURE — 82962 GLUCOSE BLOOD TEST: CPT

## 2020-11-11 PROCEDURE — 25000003 PHARM REV CODE 250: Performed by: STUDENT IN AN ORGANIZED HEALTH CARE EDUCATION/TRAINING PROGRAM

## 2020-11-11 PROCEDURE — G0378 HOSPITAL OBSERVATION PER HR: HCPCS

## 2020-11-11 PROCEDURE — 84100 ASSAY OF PHOSPHORUS: CPT

## 2020-11-11 PROCEDURE — 94660 CPAP INITIATION&MGMT: CPT

## 2020-11-11 PROCEDURE — 83735 ASSAY OF MAGNESIUM: CPT

## 2020-11-11 PROCEDURE — 25000003 PHARM REV CODE 250: Performed by: FAMILY MEDICINE

## 2020-11-11 PROCEDURE — 96372 THER/PROPH/DIAG INJ SC/IM: CPT | Mod: 59

## 2020-11-11 RX ORDER — POTASSIUM CHLORIDE 20 MEQ/1
40 TABLET, EXTENDED RELEASE ORAL
Status: DISCONTINUED | OUTPATIENT
Start: 2020-11-11 | End: 2020-11-12 | Stop reason: HOSPADM

## 2020-11-11 RX ORDER — ACETAMINOPHEN 325 MG/1
650 TABLET ORAL EVERY 4 HOURS PRN
Status: DISCONTINUED | OUTPATIENT
Start: 2020-11-11 | End: 2020-11-11

## 2020-11-11 RX ORDER — FUROSEMIDE 10 MG/ML
40 INJECTION INTRAMUSCULAR; INTRAVENOUS 2 TIMES DAILY
Status: DISCONTINUED | OUTPATIENT
Start: 2020-11-11 | End: 2020-11-11

## 2020-11-11 RX ORDER — POTASSIUM CHLORIDE 7.45 MG/ML
20 INJECTION INTRAVENOUS
Status: DISCONTINUED | OUTPATIENT
Start: 2020-11-11 | End: 2020-11-12 | Stop reason: HOSPADM

## 2020-11-11 RX ORDER — PANTOPRAZOLE SODIUM 40 MG/1
40 TABLET, DELAYED RELEASE ORAL DAILY
Status: DISCONTINUED | OUTPATIENT
Start: 2020-11-11 | End: 2020-11-12 | Stop reason: HOSPADM

## 2020-11-11 RX ORDER — CLOPIDOGREL BISULFATE 75 MG/1
75 TABLET ORAL DAILY
Status: DISCONTINUED | OUTPATIENT
Start: 2020-11-11 | End: 2020-11-12 | Stop reason: HOSPADM

## 2020-11-11 RX ORDER — BUTALBITAL, ACETAMINOPHEN AND CAFFEINE 50; 325; 40 MG/1; MG/1; MG/1
1 TABLET ORAL EVERY 4 HOURS PRN
Status: DISCONTINUED | OUTPATIENT
Start: 2020-11-11 | End: 2020-11-12 | Stop reason: HOSPADM

## 2020-11-11 RX ORDER — ASPIRIN 81 MG/1
81 TABLET ORAL NIGHTLY
Status: DISCONTINUED | OUTPATIENT
Start: 2020-11-11 | End: 2020-11-12 | Stop reason: HOSPADM

## 2020-11-11 RX ORDER — MAGNESIUM SULFATE HEPTAHYDRATE 40 MG/ML
4 INJECTION, SOLUTION INTRAVENOUS
Status: DISCONTINUED | OUTPATIENT
Start: 2020-11-11 | End: 2020-11-12 | Stop reason: HOSPADM

## 2020-11-11 RX ORDER — RANOLAZINE 500 MG/1
1000 TABLET, EXTENDED RELEASE ORAL NIGHTLY
Status: DISCONTINUED | OUTPATIENT
Start: 2020-11-11 | End: 2020-11-12 | Stop reason: HOSPADM

## 2020-11-11 RX ORDER — ISOSORBIDE MONONITRATE 60 MG/1
60 TABLET, EXTENDED RELEASE ORAL NIGHTLY
Status: DISCONTINUED | OUTPATIENT
Start: 2020-11-11 | End: 2020-11-12 | Stop reason: HOSPADM

## 2020-11-11 RX ORDER — FUROSEMIDE 20 MG/1
20 TABLET ORAL DAILY
Status: DISCONTINUED | OUTPATIENT
Start: 2020-11-12 | End: 2020-11-12 | Stop reason: HOSPADM

## 2020-11-11 RX ORDER — MAGNESIUM SULFATE 1 G/100ML
1 INJECTION INTRAVENOUS
Status: DISCONTINUED | OUTPATIENT
Start: 2020-11-11 | End: 2020-11-12 | Stop reason: HOSPADM

## 2020-11-11 RX ORDER — ATORVASTATIN CALCIUM 40 MG/1
80 TABLET, FILM COATED ORAL NIGHTLY
Status: DISCONTINUED | OUTPATIENT
Start: 2020-11-11 | End: 2020-11-12 | Stop reason: HOSPADM

## 2020-11-11 RX ORDER — MAGNESIUM SULFATE HEPTAHYDRATE 40 MG/ML
2 INJECTION, SOLUTION INTRAVENOUS
Status: DISCONTINUED | OUTPATIENT
Start: 2020-11-11 | End: 2020-11-12 | Stop reason: HOSPADM

## 2020-11-11 RX ORDER — TALC
6 POWDER (GRAM) TOPICAL NIGHTLY PRN
Status: DISCONTINUED | OUTPATIENT
Start: 2020-11-11 | End: 2020-11-12 | Stop reason: HOSPADM

## 2020-11-11 RX ORDER — ACETAMINOPHEN 325 MG/1
650 TABLET ORAL EVERY 8 HOURS PRN
Status: DISCONTINUED | OUTPATIENT
Start: 2020-11-11 | End: 2020-11-11

## 2020-11-11 RX ORDER — LANOLIN ALCOHOL/MO/W.PET/CERES
800 CREAM (GRAM) TOPICAL
Status: DISCONTINUED | OUTPATIENT
Start: 2020-11-11 | End: 2020-11-12 | Stop reason: HOSPADM

## 2020-11-11 RX ORDER — LISINOPRIL 20 MG/1
20 TABLET ORAL DAILY
Status: DISCONTINUED | OUTPATIENT
Start: 2020-11-11 | End: 2020-11-12 | Stop reason: HOSPADM

## 2020-11-11 RX ORDER — POTASSIUM CHLORIDE 20 MEQ/1
20 TABLET, EXTENDED RELEASE ORAL
Status: DISCONTINUED | OUTPATIENT
Start: 2020-11-11 | End: 2020-11-12 | Stop reason: HOSPADM

## 2020-11-11 RX ORDER — IPRATROPIUM BROMIDE AND ALBUTEROL SULFATE 2.5; .5 MG/3ML; MG/3ML
3 SOLUTION RESPIRATORY (INHALATION) EVERY 6 HOURS PRN
Status: DISCONTINUED | OUTPATIENT
Start: 2020-11-11 | End: 2020-11-12 | Stop reason: HOSPADM

## 2020-11-11 RX ORDER — ENOXAPARIN SODIUM 100 MG/ML
40 INJECTION SUBCUTANEOUS EVERY 12 HOURS
Status: DISCONTINUED | OUTPATIENT
Start: 2020-11-11 | End: 2020-11-12 | Stop reason: HOSPADM

## 2020-11-11 RX ORDER — ENOXAPARIN SODIUM 100 MG/ML
40 INJECTION SUBCUTANEOUS EVERY 24 HOURS
Status: DISCONTINUED | OUTPATIENT
Start: 2020-11-11 | End: 2020-11-11

## 2020-11-11 RX ORDER — SODIUM CHLORIDE 0.9 % (FLUSH) 0.9 %
2 SYRINGE (ML) INJECTION
Status: DISCONTINUED | OUTPATIENT
Start: 2020-11-11 | End: 2020-11-12 | Stop reason: HOSPADM

## 2020-11-11 RX ORDER — HYDROCHLOROTHIAZIDE 12.5 MG/1
12.5 TABLET ORAL DAILY
Status: DISCONTINUED | OUTPATIENT
Start: 2020-11-11 | End: 2020-11-12 | Stop reason: HOSPADM

## 2020-11-11 RX ORDER — POTASSIUM CHLORIDE 7.45 MG/ML
40 INJECTION INTRAVENOUS
Status: DISCONTINUED | OUTPATIENT
Start: 2020-11-11 | End: 2020-11-12 | Stop reason: HOSPADM

## 2020-11-11 RX ORDER — POLYETHYLENE GLYCOL 3350 17 G/17G
17 POWDER, FOR SOLUTION ORAL DAILY PRN
Status: DISCONTINUED | OUTPATIENT
Start: 2020-11-11 | End: 2020-11-12 | Stop reason: HOSPADM

## 2020-11-11 RX ORDER — ONDANSETRON 2 MG/ML
4 INJECTION INTRAMUSCULAR; INTRAVENOUS EVERY 8 HOURS PRN
Status: DISCONTINUED | OUTPATIENT
Start: 2020-11-11 | End: 2020-11-12 | Stop reason: HOSPADM

## 2020-11-11 RX ORDER — METOPROLOL SUCCINATE 50 MG/1
50 TABLET, EXTENDED RELEASE ORAL NIGHTLY
Status: DISCONTINUED | OUTPATIENT
Start: 2020-11-11 | End: 2020-11-12 | Stop reason: HOSPADM

## 2020-11-11 RX ORDER — AMLODIPINE BESYLATE 5 MG/1
10 TABLET ORAL NIGHTLY
Status: DISCONTINUED | OUTPATIENT
Start: 2020-11-11 | End: 2020-11-12 | Stop reason: HOSPADM

## 2020-11-11 RX ADMIN — HUMAN INSULIN 6 UNITS: 100 INJECTION, SOLUTION SUBCUTANEOUS at 10:11

## 2020-11-11 RX ADMIN — METOPROLOL SUCCINATE 50 MG: 50 TABLET, FILM COATED, EXTENDED RELEASE ORAL at 01:11

## 2020-11-11 RX ADMIN — ONDANSETRON 4 MG: 2 INJECTION INTRAMUSCULAR; INTRAVENOUS at 01:11

## 2020-11-11 RX ADMIN — HUMAN INSULIN 6 UNITS: 100 INJECTION, SOLUTION SUBCUTANEOUS at 07:11

## 2020-11-11 RX ADMIN — AMLODIPINE BESYLATE 10 MG: 5 TABLET ORAL at 01:11

## 2020-11-11 RX ADMIN — PANTOPRAZOLE SODIUM 40 MG: 40 TABLET, DELAYED RELEASE ORAL at 05:11

## 2020-11-11 RX ADMIN — CLOPIDOGREL BISULFATE 75 MG: 75 TABLET, FILM COATED ORAL at 08:11

## 2020-11-11 RX ADMIN — ISOSORBIDE MONONITRATE 60 MG: 60 TABLET, EXTENDED RELEASE ORAL at 10:11

## 2020-11-11 RX ADMIN — ISOSORBIDE MONONITRATE 60 MG: 60 TABLET, EXTENDED RELEASE ORAL at 01:11

## 2020-11-11 RX ADMIN — FUROSEMIDE 40 MG: 10 INJECTION, SOLUTION INTRAMUSCULAR; INTRAVENOUS at 08:11

## 2020-11-11 RX ADMIN — RANOLAZINE 1000 MG: 500 TABLET, EXTENDED RELEASE ORAL at 10:11

## 2020-11-11 RX ADMIN — LISINOPRIL 20 MG: 20 TABLET ORAL at 08:11

## 2020-11-11 RX ADMIN — ACETAMINOPHEN 650 MG: 325 TABLET, FILM COATED ORAL at 01:11

## 2020-11-11 RX ADMIN — ATORVASTATIN CALCIUM 80 MG: 40 TABLET, FILM COATED ORAL at 10:11

## 2020-11-11 RX ADMIN — BUTALBITAL, ACETAMINOPHEN AND CAFFEINE 1 TABLET: 50; 325; 40 TABLET ORAL at 04:11

## 2020-11-11 RX ADMIN — ATORVASTATIN CALCIUM 80 MG: 40 TABLET, FILM COATED ORAL at 01:11

## 2020-11-11 RX ADMIN — HUMAN INSULIN 3 UNITS: 100 INJECTION, SOLUTION SUBCUTANEOUS at 12:11

## 2020-11-11 RX ADMIN — POTASSIUM CHLORIDE 20 MEQ: 20 TABLET, EXTENDED RELEASE ORAL at 05:11

## 2020-11-11 RX ADMIN — HYDROCHLOROTHIAZIDE 12.5 MG: 12.5 TABLET ORAL at 08:11

## 2020-11-11 RX ADMIN — HUMAN INSULIN 6 UNITS: 100 INJECTION, SOLUTION SUBCUTANEOUS at 05:11

## 2020-11-11 RX ADMIN — AMLODIPINE BESYLATE 10 MG: 5 TABLET ORAL at 10:11

## 2020-11-11 RX ADMIN — MELATONIN 6 MG: at 10:11

## 2020-11-11 RX ADMIN — ENOXAPARIN SODIUM 40 MG: 40 INJECTION SUBCUTANEOUS at 08:11

## 2020-11-11 RX ADMIN — POTASSIUM CHLORIDE 20 MEQ: 20 TABLET, EXTENDED RELEASE ORAL at 01:11

## 2020-11-11 RX ADMIN — ENOXAPARIN SODIUM 40 MG: 40 INJECTION SUBCUTANEOUS at 10:11

## 2020-11-11 RX ADMIN — RANOLAZINE 1000 MG: 500 TABLET, EXTENDED RELEASE ORAL at 01:11

## 2020-11-11 RX ADMIN — ACETAMINOPHEN 650 MG: 325 TABLET, FILM COATED ORAL at 07:11

## 2020-11-11 RX ADMIN — METOPROLOL SUCCINATE 50 MG: 50 TABLET, FILM COATED, EXTENDED RELEASE ORAL at 10:11

## 2020-11-11 RX ADMIN — ONDANSETRON 4 MG: 2 INJECTION INTRAMUSCULAR; INTRAVENOUS at 04:11

## 2020-11-11 RX ADMIN — ASPIRIN 81 MG: 81 TABLET, DELAYED RELEASE ORAL at 10:11

## 2020-11-11 RX ADMIN — BUTALBITAL, ACETAMINOPHEN AND CAFFEINE 1 TABLET: 50; 325; 40 TABLET ORAL at 11:11

## 2020-11-11 NOTE — FIRST PROVIDER EVALUATION
Medical screening exam completed.  I have conducted a focused provider triage encounter, findings are as follows:    Brief history of present illness:  lower leg swelling with SOB with chest tightness history of CHF  Onset 3 days    There were no vitals filed for this visit.    Pertinent physical exam:  HHR Lungs CTA  Bilateral lower leg pitting edema    Brief workup plan:  CHF work up    Preliminary workup initiated; this workup will be continued and followed by the physician or advanced practice provider that is assigned to the patient when roomed.

## 2020-11-11 NOTE — ED PROVIDER NOTES
Encounter Date: 11/10/2020       History     Chief Complaint   Patient presents with    Leg Swelling     HPI   40-year-old female with the past medical history hypertension, diabetes, coronary artery disease, obstructive sleep apnea on CPAP at home, heart failure with preserved ejection fraction.  Presents emergency department with worsening shortness of breath when lying flat, bilateral lower extremity edema and chest pain x3 days.  Patient took 2 nitro and a baby aspirin with no improvement of symptoms.  No aggravating or alleviating factors, non radiating anterior chest wall pain.  Denies fever, chills, nausea, vomiting, cough, abdominal pain.   Review of patient's allergies indicates:   Allergen Reactions    Codeine Nausea Only and Nausea And Vomiting    Morphine Anaphylaxis and Rash     Past Medical History:   Diagnosis Date    CHF (congestive heart failure)     Diabetes mellitus     Hypertension      Past Surgical History:   Procedure Laterality Date    CARDIAC CATHETERIZATION  2019    CHOLECYSTECTOMY      LEFT HEART CATHETERIZATION Left 2019    Procedure: CATHETERIZATION, HEART, LEFT;  Surgeon: Lenard Corona MD;  Location: [facility] CATH/EP LAB;  Service: Cardiology;  Laterality: Left;    TUBAL LIGATION       Family History   Problem Relation Age of Onset    Diabetes Father     Hypertension Father     Hypertension Mother     Heart disease Paternal Grandmother      Social History     Tobacco Use    Smoking status: Former Smoker     Packs/day: 0.25     Quit date: 2019     Years since quittin.9    Smokeless tobacco: Never Used    Tobacco comment: 3 - 4 cigs a day   Substance Use Topics    Alcohol use: Not Currently     Comment: social    Drug use: Never     Review of Systems   Constitutional: Negative for chills and fever.   HENT: Negative for congestion and sore throat.    Eyes: Negative for discharge and redness.   Respiratory: Positive for shortness of breath. Negative  for cough.    Cardiovascular: Positive for chest pain and leg swelling.   Gastrointestinal: Negative for abdominal pain, nausea and vomiting.   Genitourinary: Negative for dysuria and hematuria.   Musculoskeletal: Negative for back pain and neck pain.   Skin: Negative for rash.   Neurological: Negative for syncope, weakness, light-headedness and headaches.   Hematological: Does not bruise/bleed easily.       Physical Exam     Initial Vitals [11/10/20 1841]   BP Pulse Resp Temp SpO2   (!) 223/98 96 20 98.6 °F (37 °C) 98 %      MAP       --         Physical Exam    Constitutional: She appears well-developed. No distress.   HENT:   Head: Normocephalic and atraumatic.   Mouth/Throat: Oropharynx is clear and moist.   Eyes: EOM are normal. Pupils are equal, round, and reactive to light. No scleral icterus.   Neck: Normal range of motion. Neck supple. No JVD present.   Cardiovascular: Normal rate, regular rhythm and normal heart sounds.   No murmur heard.  Pulmonary/Chest: No respiratory distress. She has no wheezes. She has rhonchi. She exhibits tenderness.   Scant crackles at the bases bilaterally.  No reproducible chest wall tenderness to palpation.   Abdominal: Soft. Bowel sounds are normal. She exhibits no distension. There is no abdominal tenderness.   Musculoskeletal: Normal range of motion. Edema present.      Comments: Bilateral 3+ pitting edema   Neurological: She is alert and oriented to person, place, and time. No cranial nerve deficit. GCS score is 15. GCS eye subscore is 4. GCS verbal subscore is 5. GCS motor subscore is 6.   Skin: Skin is warm. No rash and no abscess noted.         ED Course   Procedures  Labs Reviewed   CBC W/ AUTO DIFFERENTIAL - Abnormal; Notable for the following components:       Result Value    Hemoglobin 10.9 (*)     Hematocrit 35.9 (*)     MCV 78 (*)     MCH 23.8 (*)     MCHC 30.4 (*)     RDW 16.9 (*)     Immature Granulocytes 0.9 (*)     Immature Grans (Abs) 0.05 (*)     All other  components within normal limits   COMPREHENSIVE METABOLIC PANEL - Abnormal; Notable for the following components:    Glucose 230 (*)     All other components within normal limits   TROPONIN I   B-TYPE NATRIURETIC PEPTIDE   PROTIME-INR   SARS-COV-2 RNA AMPLIFICATION, QUAL          Imaging Results          X-Ray Chest AP Portable (Final result)  Result time 11/10/20 19:47:54    Final result by Isiah Marie IV, MD (11/10/20 19:47:54)                 Narrative:    Chest, single view    HISTORY: Congestive heart failure.    Comparison 12/6/2019.    The heart size is within normal limits and there is no evidence of  acute pulmonary vascular engorgement.    The lungs are appropriately expanded and free of confluent infiltrate  or significant volume loss. No effusion is observed.    IMPRESSION:    No acute cardiopulmonary disease.    Electronically Signed by Isiah Marie M.D. on 11/10/2020 7:59 PM                                                    MDM:  40-year-old female with the past medical history hypertension, diabetes, coronary artery disease, obstructive sleep apnea on CPAP at home, heart failure with preserved ejection fraction.  Presents emergency department with worsening shortness of breath when lying flat, bilateral lower extremity edema and chest pain x3 days.  Patient took 2 nitro and a baby aspirin with no improvement of symptoms.  No aggravating or alleviating factors, non radiating anterior chest wall pain.  Denies fever, chills, nausea, vomiting, cough, abdominal pain.  Hypertensive 220 systolic blood pressure on arrival.  Scant crackles at the bases bilaterally, no reproducible anterior chest wall tenderness to palpation.  3+ bilateral pitting edema.  Labs overall unremarkable negative troponin BNP is 71 however patient is obese and may be falsely low.  ECG showed no signs of ischemia including ST elevation, ST-depression or T-wave inversions.  Chest x-ray showed no cardiopulmonary abnormalities however  patient had persistent chest pain after 3 doses of nitro, Demerol and Tylenol.  Given her history of recent heart catheterization with several blockages that were unable to be stented she is high risk for ACS and will be admitting for further observation.       Clinical Impression:       ICD-10-CM ICD-9-CM   1. Bilateral lower extremity edema  R60.0 782.3   2. Shortness of breath  R06.02 786.05   3. Chest pain  R07.9 786.50                          ED Disposition Condition    Observation                             Sathya Snyder MD  Resident  11/10/20 [id number]

## 2020-11-11 NOTE — HPI
40-year-old female with hypertension, diabetes mellitus 2, obstructive sleep apnea on CPAP, CAD admitted for chest pain.  Patient reports that she was in usual health 3 days ago only some developed mild pressure-like left-sided chest pain radiating to her back that is relieved with nitroglycerin.  Denies shortness of breath, diaphoresis, nausea, vomiting.  Also notice swelling of the lower extremities.  Symptoms became worse the next 2 days.  She could not put her shoes on.  She called the cardiology office today and was instructed to come to the ED for further evaluation.    In the ED, patient was hemodynamically stable.  EKG and initial troponin were unremarkable.

## 2020-11-11 NOTE — H&P
[facility] Medicine  History & Physical    Patient Name: Marilyn Madera  MRN: [id number]  Admission Date: 11/10/2020  Attending Physician: Dread Pete MD   Primary Care Provider: RAGINI Chiang         Patient information was obtained from patient, past medical records and ER records.     Subjective:     Principal Problem:Chest pain    Chief Complaint:   Chief Complaint   Patient presents with    Leg Swelling        HPI: 40-year-old female with hypertension, diabetes mellitus 2, obstructive sleep apnea on CPAP, CAD admitted for chest pain.  Patient reports that she was in usual health 3 days ago only some developed mild pressure-like left-sided chest pain radiating to her back that is relieved with nitroglycerin.  Denies shortness of breath, diaphoresis, nausea, vomiting.  Also notice swelling of the lower extremities.  Symptoms became worse the next 2 days.  She could not put her shoes on.  She called the cardiology office today and was instructed to come to the ED for further evaluation.    In the ED, patient was hemodynamically stable.  EKG and initial troponin were unremarkable.    Past Medical History:   Diagnosis Date    CHF (congestive heart failure)     Diabetes mellitus     Hypertension        Past Surgical History:   Procedure Laterality Date    CARDIAC CATHETERIZATION  12/09/2019    CHOLECYSTECTOMY      LEFT HEART CATHETERIZATION Left 12/9/2019    Procedure: CATHETERIZATION, HEART, LEFT;  Surgeon: Lenard Corona MD;  Location: [facility] CATH/EP LAB;  Service: Cardiology;  Laterality: Left;    TUBAL LIGATION         Review of patient's allergies indicates:   Allergen Reactions    Codeine Nausea Only and Nausea And Vomiting    Morphine Anaphylaxis and Rash       No current facility-administered medications on file prior to encounter.      Current Outpatient Medications on File Prior to Encounter   Medication Sig    amLODIPine (NORVASC) 10 MG tablet Take 10 mg  by mouth every evening.     aspirin (ECOTRIN) 81 MG EC tablet Take 1 tablet by mouth once daily (Patient taking differently: Take 81 mg by mouth every evening. )    atorvastatin (LIPITOR) 80 MG tablet Take 1 tablet (80 mg total) by mouth once daily. (Patient taking differently: Take 80 mg by mouth every evening. )    isosorbide mononitrate (IMDUR) 60 MG 24 hr tablet Take 60 mg by mouth every evening.    lisinopril-hydrochlorothiazide (PRINZIDE,ZESTORETIC) 20-12.5 mg per tablet Take 1 tablet by mouth every evening.     metFORMIN (GLUCOPHAGE) 500 MG tablet Take 500 mg by mouth every evening.     metoprolol succinate (TOPROL-XL) 50 MG 24 hr tablet Take 1 tablet (50 mg total) by mouth once daily. (Patient taking differently: Take 50 mg by mouth every evening. )    nitroGLYCERIN (NITROSTAT) 0.4 MG SL tablet Take 1 tablet by mouth every 5 (five) minutes as needed for Chest pain.    omeprazole (PRILOSEC) 40 MG capsule Take 40 mg by mouth once daily.    ranolazine (RANEXA) 500 MG Tb12 Take 500 mg by mouth every evening.    clopidogrel (PLAVIX) 75 mg tablet Take 1 tablet by mouth once daily.     Family History     Problem Relation (Age of Onset)    Diabetes Father    Heart disease Paternal Grandmother    Hypertension Father, Mother        Tobacco Use    Smoking status: Former Smoker     Packs/day: 0.25     Quit date: 2019     Years since quittin.9    Smokeless tobacco: Never Used    Tobacco comment: 3 - 4 cigs a day   Substance and Sexual Activity    Alcohol use: Not Currently     Comment: social    Drug use: Never    Sexual activity: Not on file     Review of Systems   Constitutional: Negative for chills, fatigue, fever and unexpected weight change.   HENT: Negative for ear pain, rhinorrhea, sneezing and sore throat.    Eyes: Negative for visual disturbance.   Respiratory: Negative for cough, chest tightness and shortness of breath.    Cardiovascular: Negative for chest pain.   Gastrointestinal:  Negative for abdominal pain, constipation, diarrhea, nausea and vomiting.   Endocrine: Negative for polyuria.   Genitourinary: Negative for dysuria and hematuria.   Neurological: Negative for seizures and headaches.     Objective:     Vital Signs (Most Recent):  Temp: 98.6 °F (37 °C) (11/10/20 1841)  Pulse: 94 (11/10/20 2118)  Resp: 18 (11/10/20 2145)  BP: (!) 158/79 (11/10/20 2118)  SpO2: 95 % (11/10/20 2118) Vital Signs (24h Range):  Temp:  [98.6 °F (37 °C)] 98.6 °F (37 °C)  Pulse:  [85-96] 94  Resp:  [18-28] 18  SpO2:  [95 %-98 %] 95 %  BP: (158-223)/(79-98) 158/79     Weight: 101.2 kg (223 lb)  Body mass index is 40.79 kg/m².    Physical Exam  Vitals signs and nursing note reviewed.   Constitutional:       General: She is not in acute distress.     Appearance: Normal appearance. She is well-developed. She is obese. She is not ill-appearing, toxic-appearing or diaphoretic.   HENT:      Head: Normocephalic and atraumatic.      Right Ear: External ear normal.      Left Ear: External ear normal.      Nose: Nose normal. No congestion.      Mouth/Throat:      Mouth: Mucous membranes are moist.      Pharynx: No oropharyngeal exudate or posterior oropharyngeal erythema.   Eyes:      General: No scleral icterus.        Right eye: No discharge.         Left eye: No discharge.      Extraocular Movements: Extraocular movements intact.      Conjunctiva/sclera: Conjunctivae normal.      Pupils: Pupils are equal, round, and reactive to light.   Neck:      Musculoskeletal: Normal range of motion and neck supple.      Thyroid: No thyromegaly.   Cardiovascular:      Rate and Rhythm: Normal rate and regular rhythm.      Heart sounds: Normal heart sounds. No murmur. No friction rub. No gallop.       Comments: +3 pitting edema up to mid tibia  Pulmonary:      Effort: Pulmonary effort is normal. No respiratory distress.      Breath sounds: Normal breath sounds. No wheezing or rales.   Chest:      Chest wall: No tenderness.    Abdominal:      General: Bowel sounds are normal. There is no distension.      Palpations: Abdomen is soft. There is no mass.      Tenderness: There is no abdominal tenderness. There is no guarding or rebound.      Hernia: No hernia is present.      Comments: Protuberant abdomen   Musculoskeletal: Normal range of motion.         General: No tenderness.   Lymphadenopathy:      Cervical: No cervical adenopathy.   Skin:     General: Skin is warm.   Neurological:      Mental Status: She is alert and oriented to person, place, and time.   Psychiatric:         Mood and Affect: Mood normal.         Behavior: Behavior normal.         Thought Content: Thought content normal.         Judgment: Judgment normal.           CRANIAL NERVES     CN III, IV, VI   Pupils are equal, round, and reactive to light.       Significant Labs:   CBC:   Recent Labs   Lab 11/10/20  1921   WBC 5.32   HGB 10.9*   HCT 35.9*        CMP:   Recent Labs   Lab 11/10/20  1921      K 3.7      CO2 25   *   BUN 15   CREATININE 0.8   CALCIUM 9.4   PROT 7.4   ALBUMIN 3.8   BILITOT 0.4   ALKPHOS 126   AST 17   ALT 22   ANIONGAP 12   EGFRNONAA >60.0     Cardiac Markers:   Recent Labs   Lab 11/10/20  1921   BNP 71     Troponin:   Recent Labs   Lab 11/10/20  1921   TROPONINI <0.030       EKG  My personal interpretation: No acute ST elevation or depression appreciated     Significant Imaging:     X-Ray Chest AP Portable [[id number]] Collected: 11/10/20 1929   Order Status: Completed Updated: 11/10/20 2003   Narrative:     Chest, single view     HISTORY: Congestive heart failure.     Comparison 12/6/2019.     The heart size is within normal limits and there is no evidence of   acute pulmonary vascular engorgement.     The lungs are appropriately expanded and free of confluent infiltrate   or significant volume loss. No effusion is observed.     IMPRESSION:     No acute cardiopulmonary disease.          Assessment/Plan:     Active Hospital  Problems    Diagnosis  POA    *Chest pain [R07.9]  Yes    Obesity [E66.9]  Yes    Swelling of lower extremity [M79.89]  Yes    Dyslipidemia [E78.5]  Yes     Chronic    Coronary artery disease of native artery of native heart with stable angina pectoris [I25.118]  Yes     Chronic    Sleep apnea [G47.30]  Yes     Chronic    DM (diabetes mellitus), type 2 [E11.9]  Yes     Chronic    Hypertension [I10]  Yes     Chronic      Resolved Hospital Problems   No resolved problems to display.       Plan:  Patient angiogram December 2019  · The left ventricular systolic function is normal.  · LV end diastolic pressure is elevated.  · The ejection fraction is calculated to be 60%.  · LAD has mild nonobstructive long tubular stenosis about 30-40%.  · The small she caliber distal left circumflex artery has severe stenosis about 90%.  · Ostial and proximal ramus intermedius has a stenosis of 90 % followed by sequential stenosis about 75%.  · RCA moderate large caliber vessel PDA has diffuse luminal narrowing in the range of 30-40%.  · PLVB is patent and gives collaterals to the distal left circumflex artery/ramus intermedius not adequately formed.    Normal BNP and no prior history of heart failure.  Suspect venous insufficiency causing lower extremity swelling  Trending cardiac enzymes  Echo pending  Increased ranolazine  Elevate lower extremities  Lasix IV b.i.d., fluid restrict  HbA1c, lipid panel pending  ISS  CPAP qhs  Refused nicotine patch.  Smoking cessation was counseled  NPO midnight for possible intervention tomorrow    Consult Cardiology, Dr. Corona  Appreciate consult      VTE Risk Mitigation (From admission, onward)    None             Dread Pete MD  Department of Hospital Medicine   [facility]  Date of service: 11/10/2020

## 2020-11-11 NOTE — SUBJECTIVE & OBJECTIVE
Past Medical History:   Diagnosis Date    CHF (congestive heart failure)     Diabetes mellitus     Hypertension        Past Surgical History:   Procedure Laterality Date    CARDIAC CATHETERIZATION  12/09/2019    CHOLECYSTECTOMY      LEFT HEART CATHETERIZATION Left 12/9/2019    Procedure: CATHETERIZATION, HEART, LEFT;  Surgeon: Lenard Corona MD;  Location: [facility] CATH/EP LAB;  Service: Cardiology;  Laterality: Left;    TUBAL LIGATION         Review of patient's allergies indicates:   Allergen Reactions    Codeine Nausea Only and Nausea And Vomiting    Morphine Anaphylaxis and Rash       No current facility-administered medications on file prior to encounter.      Current Outpatient Medications on File Prior to Encounter   Medication Sig    amLODIPine (NORVASC) 10 MG tablet Take 10 mg by mouth every evening.     aspirin (ECOTRIN) 81 MG EC tablet Take 1 tablet by mouth once daily (Patient taking differently: Take 81 mg by mouth every evening. )    atorvastatin (LIPITOR) 80 MG tablet Take 1 tablet (80 mg total) by mouth once daily. (Patient taking differently: Take 80 mg by mouth every evening. )    isosorbide mononitrate (IMDUR) 60 MG 24 hr tablet Take 60 mg by mouth every evening.    lisinopril-hydrochlorothiazide (PRINZIDE,ZESTORETIC) 20-12.5 mg per tablet Take 1 tablet by mouth every evening.     metFORMIN (GLUCOPHAGE) 500 MG tablet Take 500 mg by mouth every evening.     metoprolol succinate (TOPROL-XL) 50 MG 24 hr tablet Take 1 tablet (50 mg total) by mouth once daily. (Patient taking differently: Take 50 mg by mouth every evening. )    nitroGLYCERIN (NITROSTAT) 0.4 MG SL tablet Take 1 tablet by mouth every 5 (five) minutes as needed for Chest pain.    omeprazole (PRILOSEC) 40 MG capsule Take 40 mg by mouth once daily.    ranolazine (RANEXA) 500 MG Tb12 Take 500 mg by mouth every evening.    clopidogrel (PLAVIX) 75 mg tablet Take 1 tablet by mouth once daily.     Family History     Problem  Relation (Age of Onset)    Diabetes Father    Heart disease Paternal Grandmother    Hypertension Father, Mother        Tobacco Use    Smoking status: Former Smoker     Packs/day: 0.25     Quit date: 2019     Years since quittin.9    Smokeless tobacco: Never Used    Tobacco comment: 3 - 4 cigs a day   Substance and Sexual Activity    Alcohol use: Not Currently     Comment: social    Drug use: Never    Sexual activity: Not on file     Review of Systems   Constitutional: Negative for chills, fatigue, fever and unexpected weight change.   HENT: Negative for ear pain, rhinorrhea, sneezing and sore throat.    Eyes: Negative for visual disturbance.   Respiratory: Negative for cough, chest tightness and shortness of breath.    Cardiovascular: Negative for chest pain.   Gastrointestinal: Negative for abdominal pain, constipation, diarrhea, nausea and vomiting.   Endocrine: Negative for polyuria.   Genitourinary: Negative for dysuria and hematuria.   Neurological: Negative for seizures and headaches.     Objective:     Vital Signs (Most Recent):  Temp: 98.6 °F (37 °C) (11/10/20 184)  Pulse: 94 (11/10/20 2118)  Resp: 18 (11/10/20 2145)  BP: (!) 158/79 (11/10/20 2118)  SpO2: 95 % (11/10/20 2118) Vital Signs (24h Range):  Temp:  [98.6 °F (37 °C)] 98.6 °F (37 °C)  Pulse:  [85-96] 94  Resp:  [18-28] 18  SpO2:  [95 %-98 %] 95 %  BP: (158-223)/(79-98) 158/79     Weight: 101.2 kg (223 lb)  Body mass index is 40.79 kg/m².    Physical Exam  Vitals signs and nursing note reviewed.   Constitutional:       General: She is not in acute distress.     Appearance: Normal appearance. She is well-developed. She is obese. She is not ill-appearing, toxic-appearing or diaphoretic.   HENT:      Head: Normocephalic and atraumatic.      Right Ear: External ear normal.      Left Ear: External ear normal.      Nose: Nose normal. No congestion.      Mouth/Throat:      Mouth: Mucous membranes are moist.      Pharynx: No oropharyngeal  exudate or posterior oropharyngeal erythema.   Eyes:      General: No scleral icterus.        Right eye: No discharge.         Left eye: No discharge.      Extraocular Movements: Extraocular movements intact.      Conjunctiva/sclera: Conjunctivae normal.      Pupils: Pupils are equal, round, and reactive to light.   Neck:      Musculoskeletal: Normal range of motion and neck supple.      Thyroid: No thyromegaly.   Cardiovascular:      Rate and Rhythm: Normal rate and regular rhythm.      Heart sounds: Normal heart sounds. No murmur. No friction rub. No gallop.       Comments: +3 pitting edema up to mid tibia  Pulmonary:      Effort: Pulmonary effort is normal. No respiratory distress.      Breath sounds: Normal breath sounds. No wheezing or rales.   Chest:      Chest wall: No tenderness.   Abdominal:      General: Bowel sounds are normal. There is no distension.      Palpations: Abdomen is soft. There is no mass.      Tenderness: There is no abdominal tenderness. There is no guarding or rebound.      Hernia: No hernia is present.      Comments: Protuberant abdomen   Musculoskeletal: Normal range of motion.         General: No tenderness.   Lymphadenopathy:      Cervical: No cervical adenopathy.   Skin:     General: Skin is warm.   Neurological:      Mental Status: She is alert and oriented to person, place, and time.   Psychiatric:         Mood and Affect: Mood normal.         Behavior: Behavior normal.         Thought Content: Thought content normal.         Judgment: Judgment normal.           CRANIAL NERVES     CN III, IV, VI   Pupils are equal, round, and reactive to light.       Significant Labs:   CBC:   Recent Labs   Lab 11/10/20  1921   WBC 5.32   HGB 10.9*   HCT 35.9*        CMP:   Recent Labs   Lab 11/10/20  1921      K 3.7      CO2 25   *   BUN 15   CREATININE 0.8   CALCIUM 9.4   PROT 7.4   ALBUMIN 3.8   BILITOT 0.4   ALKPHOS 126   AST 17   ALT 22   ANIONGAP 12   EGFRNONAA >60.0      Cardiac Markers:   Recent Labs   Lab 11/10/20  1921   BNP 71     Troponin:   Recent Labs   Lab 11/10/20  1921   TROPONINI <0.030       EKG  My personal interpretation: No acute ST elevation or depression appreciated     Significant Imaging:     X-Ray Chest AP Portable [[id number]] Collected: 11/10/20 1929   Order Status: Completed Updated: 11/10/20 2003   Narrative:     Chest, single view     HISTORY: Congestive heart failure.     Comparison 12/6/2019.     The heart size is within normal limits and there is no evidence of   acute pulmonary vascular engorgement.     The lungs are appropriately expanded and free of confluent infiltrate   or significant volume loss. No effusion is observed.     IMPRESSION:     No acute cardiopulmonary disease.

## 2020-11-11 NOTE — PROGRESS NOTES
"[facility] Medicine    Progress Note    Patient Name: Marilyn Madera  MRN: [id number]  Patient Class: OP- Observation   Admission Date: 11/10/2020  7:32 PM  Length of Stay: 0  Attending Physician: Kalli Grullon MD  Primary Care Provider: RAGINI Chiang  Face-to-Face encounter date: 11/11/2020  Code status:  Chief Complaint: Leg Swelling        Subjective:    HPI:40-year-old  female with hypertension, diabetes mellitus 2, obstructive sleep apnea on CPAP, CAD admitted for chest pain.  Patient reported that she was in usual health 3 days ago only some developed mild pressure-like left-sided chest pain radiating to her back that is relieved with nitroglycerin.  Denied shortness of breath, diaphoresis, nausea, vomiting.  Also notice swelling of the lower extremities.  Symptoms became worse the next 2 days.  She could not put her shoes on.  She called the cardiology office today and was instructed to come to the ED for further evaluation.    Interval History: Patient is doing well.  Family present at bedside.No concerns/issues overnight reported by the patient or the nursing staff.    Review of Systems All other Review of Systems were found to be negative expect for that mentioned already in HPI.     Objective:     Vitals:    11/10/20 2345 11/11/20 0036 11/11/20 0109 11/11/20 0300   BP: (!) 167/74  (!) 187/95 127/62   BP Location:   Right arm Right arm   Patient Position:   Lying Lying   Pulse: 79 73 74 64   Resp: (!) 25 (!) 27 (!) 22 20   Temp:   97.6 °F (36.4 °C) 97.5 °F (36.4 °C)   TempSrc:   Oral Oral   SpO2: 96% 96% 95% 96%   Weight:   122.3 kg (269 lb 10 oz)    Height:   5' 2" (1.575 m)         Vitals reviewed.  Constitutional: No distress.   HENT: NC  Head: Atraumatic.   Cardiovascular: Normal rate, regular rhythm and normal heart sounds.   Pulmonary/Chest: Effort normal. No wheezes.   Abdominal: Soft. Bowel sounds are normal. No distension and no mass. No " tenderness  Neurological: Alert.   Skin: Skin is warm and dry.   Psych: Appropriate mood and affect    Following labs were Reviewed   CBC:  Recent Labs   Lab 11/10/20  1921   WBC 5.32   HGB 10.9*   HCT 35.9*        CMP:  Recent Labs   Lab 11/10/20  1921 11/11/20  0120   CALCIUM 9.4 9.2   ALBUMIN 3.8  --    PROT 7.4  --     140   K 3.7 3.7   CO2 25 23    105   BUN 15 17   CREATININE 0.8 0.7   ALKPHOS 126  --    ALT 22  --    AST 17  --    BILITOT 0.4  --          Radiology Reports  X-ray Chest Ap Portable  Result Date: 11/10/2020  IMPRESSION: No acute cardiopulmonary disease.    Meds  Scheduled Meds:   amLODIPine  10 mg Oral QHS    aspirin  81 mg Oral QHS    atorvastatin  80 mg Oral QHS    clopidogreL  75 mg Oral Daily    enoxaparin  40 mg Subcutaneous Q12H    furosemide (LASIX) IV  40 mg Intravenous BID    hydroCHLOROthiazide  12.5 mg Oral Daily    isosorbide mononitrate  60 mg Oral QHS    lisinopriL  20 mg Oral Daily    metoprolol succinate  50 mg Oral QHS    pantoprazole  40 mg Oral Daily    ranolazine  1,000 mg Oral QHS     Continuous Infusions:  PRN Meds:.acetaminophen, acetaminophen, albuterol-ipratropium, calcium chloride IVPB, calcium chloride IVPB, calcium chloride IVPB, dextrose 50%, dextrose 50%, insulin regular, magnesium oxide, magnesium sulfate IVPB, magnesium sulfate IVPB, magnesium sulfate IVPB, magnesium sulfate IVPB, melatonin, ondansetron, polyethylene glycol, potassium chloride in water, potassium chloride in water, potassium chloride in water, potassium chloride in water, potassium chloride, potassium chloride, potassium chloride, potassium chloride, sodium chloride 0.9%, sodium phosphate IVPB, sodium phosphate IVPB, sodium phosphate IVPB, sodium phosphate IVPB, sodium phosphate IVPB.    Assessment & Plan:     Active Hospital Problems    Diagnosis    *Chest pain  - ACS ruled out  - troponins trended negative  - echo pending  - cardiology consulted      Coronary  artery disease of native artery of native heart with stable angina pectoris  - stable  - continue home medication of aspirin 81 mg, Plavix 75 mg daily and isosorbide mononitrate 60 mg nightly, atorvastatin 80 mg daily, Toprol 50 mg daily      DM (diabetes mellitus), type 2  - metformin held  - sliding scale  - regular Accu-Chek      Hypertension  - controlled  - continue home medication of amlodipine 10 mg daily, hydrochlorothiazide 12.5 mg daily, lisinopril 20 mg daily           Discharge Planning:   Is the patient medically ready for discharge?: No    Reason for patient still in hospital (select all that apply): Patient trending condition and Treatment    Above encounter included review of the medical records, interviewing and examining the patient face-to-face, discussion with family and other health care providers, ordering and interpreting lab/test results and formulating a plan of care.     Medical Decision Making:      [_] Low Complexity  [_] Moderate Complexity  [x] High Complexity      Kalli Grullon MD  Department of Hospital Medicine   [facility]

## 2020-11-11 NOTE — PLAN OF CARE
11/11/20 0808   Patient Assessment/Suction   Level of Consciousness (AVPU) alert   Respiratory Effort Normal;Unlabored   Expansion/Accessory Muscles/Retractions no use of accessory muscles;no retractions;expansion symmetric   All Lung Fields Breath Sounds clear   Rhythm/Pattern, Respiratory unlabored;pattern regular;depth regular;chest wiggle adequate;no shortness of breath reported   Cough Frequency infrequent   Cough Type good   PRE-TX-O2   O2 Device (Oxygen Therapy) room air   SpO2 95 %   Pulse Oximetry Type Intermittent   $ Pulse Oximetry - Multiple Charge Pulse Oximetry - Multiple   Pulse 65   Resp 19   Aerosol Therapy   $ Aerosol Therapy Charges PRN treatment not required   Preset CPAP/BiPAP Settings   Mode Of Delivery CPAP;Standby   $ CPAP/BiPAP Daily Charge BiPAP/CPAP Daily   Respiratory Evaluation   $ Care Plan Tech Time 15 min

## 2020-11-12 VITALS
WEIGHT: 269.63 LBS | DIASTOLIC BLOOD PRESSURE: 58 MMHG | RESPIRATION RATE: 18 BRPM | BODY MASS INDEX: 49.62 KG/M2 | OXYGEN SATURATION: 96 % | HEIGHT: 62 IN | SYSTOLIC BLOOD PRESSURE: 120 MMHG | HEART RATE: 56 BPM | TEMPERATURE: 98 F

## 2020-11-12 PROBLEM — R07.9 CHEST PAIN: Status: RESOLVED | Noted: 2020-11-10 | Resolved: 2020-11-12

## 2020-11-12 LAB
ANION GAP SERPL CALC-SCNC: 10 MMOL/L (ref 8–16)
BUN SERPL-MCNC: 20 MG/DL (ref 6–20)
CALCIUM SERPL-MCNC: 9.1 MG/DL (ref 8.7–10.5)
CHLORIDE SERPL-SCNC: 101 MMOL/L (ref 95–110)
CO2 SERPL-SCNC: 24 MMOL/L (ref 23–29)
CREAT SERPL-MCNC: 0.9 MG/DL (ref 0.5–1.4)
ERYTHROCYTE [DISTWIDTH] IN BLOOD BY AUTOMATED COUNT: 17.1 % (ref 11.5–14.5)
EST. GFR  (AFRICAN AMERICAN): >60 ML/MIN/1.73 M^2
EST. GFR  (NON AFRICAN AMERICAN): >60 ML/MIN/1.73 M^2
GLUCOSE SERPL-MCNC: 250 MG/DL (ref 70–110)
GLUCOSE SERPL-MCNC: 264 MG/DL (ref 70–110)
HCT VFR BLD AUTO: 33.6 % (ref 37–48.5)
HGB BLD-MCNC: 10.2 G/DL (ref 12–16)
MAGNESIUM SERPL-MCNC: 1.9 MG/DL (ref 1.6–2.6)
MCH RBC QN AUTO: 23.9 PG (ref 27–31)
MCHC RBC AUTO-ENTMCNC: 30.4 G/DL (ref 32–36)
MCV RBC AUTO: 79 FL (ref 82–98)
PHOSPHATE SERPL-MCNC: 4.4 MG/DL (ref 2.7–4.5)
PLATELET # BLD AUTO: 279 K/UL (ref 150–350)
PMV BLD AUTO: 9.2 FL (ref 9.2–12.9)
POTASSIUM SERPL-SCNC: 3.8 MMOL/L (ref 3.5–5.1)
RBC # BLD AUTO: 4.27 M/UL (ref 4–5.4)
SODIUM SERPL-SCNC: 135 MMOL/L (ref 136–145)
WBC # BLD AUTO: 4.91 K/UL (ref 3.9–12.7)

## 2020-11-12 PROCEDURE — 96372 THER/PROPH/DIAG INJ SC/IM: CPT | Mod: 59

## 2020-11-12 PROCEDURE — 99900035 HC TECH TIME PER 15 MIN (STAT)

## 2020-11-12 PROCEDURE — 25000003 PHARM REV CODE 250: Performed by: STUDENT IN AN ORGANIZED HEALTH CARE EDUCATION/TRAINING PROGRAM

## 2020-11-12 PROCEDURE — 80048 BASIC METABOLIC PNL TOTAL CA: CPT

## 2020-11-12 PROCEDURE — 25000003 PHARM REV CODE 250: Performed by: FAMILY MEDICINE

## 2020-11-12 PROCEDURE — 99220 PR INITIAL OBSERVATION CARE,LEVL III: ICD-10-PCS | Mod: ,,, | Performed by: INTERNAL MEDICINE

## 2020-11-12 PROCEDURE — 63600175 PHARM REV CODE 636 W HCPCS: Performed by: FAMILY MEDICINE

## 2020-11-12 PROCEDURE — 84100 ASSAY OF PHOSPHORUS: CPT

## 2020-11-12 PROCEDURE — 82962 GLUCOSE BLOOD TEST: CPT

## 2020-11-12 PROCEDURE — G0378 HOSPITAL OBSERVATION PER HR: HCPCS

## 2020-11-12 PROCEDURE — 36415 COLL VENOUS BLD VENIPUNCTURE: CPT

## 2020-11-12 PROCEDURE — 99220 PR INITIAL OBSERVATION CARE,LEVL III: CPT | Mod: ,,, | Performed by: INTERNAL MEDICINE

## 2020-11-12 PROCEDURE — 94660 CPAP INITIATION&MGMT: CPT

## 2020-11-12 PROCEDURE — 94761 N-INVAS EAR/PLS OXIMETRY MLT: CPT

## 2020-11-12 PROCEDURE — 83735 ASSAY OF MAGNESIUM: CPT

## 2020-11-12 PROCEDURE — 85027 COMPLETE CBC AUTOMATED: CPT

## 2020-11-12 RX ADMIN — ENOXAPARIN SODIUM 40 MG: 40 INJECTION SUBCUTANEOUS at 08:11

## 2020-11-12 RX ADMIN — HYDROCHLOROTHIAZIDE 12.5 MG: 12.5 TABLET ORAL at 08:11

## 2020-11-12 RX ADMIN — HUMAN INSULIN 6 UNITS: 100 INJECTION, SOLUTION SUBCUTANEOUS at 08:11

## 2020-11-12 RX ADMIN — POTASSIUM CHLORIDE 20 MEQ: 20 TABLET, EXTENDED RELEASE ORAL at 06:11

## 2020-11-12 RX ADMIN — FUROSEMIDE 20 MG: 20 TABLET ORAL at 09:11

## 2020-11-12 RX ADMIN — CLOPIDOGREL BISULFATE 75 MG: 75 TABLET, FILM COATED ORAL at 08:11

## 2020-11-12 RX ADMIN — PANTOPRAZOLE SODIUM 40 MG: 40 TABLET, DELAYED RELEASE ORAL at 06:11

## 2020-11-12 RX ADMIN — LISINOPRIL 20 MG: 20 TABLET ORAL at 08:11

## 2020-11-12 NOTE — PLAN OF CARE
Spoke with pt in room, verified her address, phone numbers on FS, lives with her S.O. Mr Gunderson, Pharmacy used is the [facility] in [place] on [address]. Her PCP is Dr Saucedo and sees  RAGINI Romo in his office.         11/12/20 1148   Discharge Assessment   Assessment Type Discharge Planning Assessment   Confirmed/corrected address and phone number on facesheet? Yes   Assessment information obtained from? Patient   Expected Length of Stay (days) 2   Communicated expected length of stay with patient/caregiver yes   Prior to hospitilization cognitive status: Alert/Oriented   Prior to hospitalization functional status: Independent   Current cognitive status: Alert/Oriented   Current Functional Status: Independent   Facility Arrived From: Home   Lives With significant other   Able to Return to Prior Arrangements yes   Is patient able to care for self after discharge? Yes   Who are your caregiver(s) and their phone number(s)? S.O. Mr Gunderson Cell [phone]   Patient's perception of discharge disposition home or selfcare   Readmission Within the Last 30 Days no previous admission in last 30 days   Patient currently being followed by outpatient case management? No   Patient currently receives any other outside agency services? No   Equipment Currently Used at Home CPAP   Part D Coverage [employer]BS Out of State   Do you have any problems affording any of your prescribed medications? No   Is the patient taking medications as prescribed? yes   Does the patient have transportation home? Yes   Transportation Anticipated family or friend will provide   Dialysis Name and Scheduled days NA   Does the patient receive services at the Coumadin Clinic? No   Discharge Plan A Home with family   Discharge Plan B Home with family   DME Needed Upon Discharge  none   Patient/Family in Agreement with Plan yes

## 2020-11-12 NOTE — PLAN OF CARE
11/12/20 0814   Patient Assessment/Suction   Level of Consciousness (AVPU) alert   Respiratory Effort Unlabored;Normal   Expansion/Accessory Muscles/Retractions no use of accessory muscles;no retractions;expansion symmetric   All Lung Fields Breath Sounds equal bilaterally;clear   Rhythm/Pattern, Respiratory unlabored;pattern regular;depth regular;chest wiggle adequate;no shortness of breath reported   Cough Frequency no cough   PRE-TX-O2   O2 Device (Oxygen Therapy) room air   SpO2 96 %   Pulse Oximetry Type Intermittent   $ Pulse Oximetry - Multiple Charge Pulse Oximetry - Multiple   Pulse (!) 56   Resp 18   Aerosol Therapy   $ Aerosol Therapy Charges PRN treatment not required   Preset CPAP/BiPAP Settings   Mode Of Delivery CPAP;Standby   $ CPAP/BiPAP Daily Charge BiPAP/CPAP Daily   Respiratory Evaluation   $ Care Plan Tech Time 15 min

## 2020-11-12 NOTE — PLAN OF CARE
11/12/20 1153   Final Note   Assessment Type Final Discharge Note   Anticipated Discharge Disposition Home   Post-Acute Status   Post-Acute Authorization Other   Part D Coverage [employer] out of state   Other Status No Post-Acute Service Needs   Discharge Delays None known at this time

## 2020-11-12 NOTE — NURSING
Went over discharge information with the patient answered questions she had at that time.  CNA Removed 1 PIV without difficulty tip intact. Patient left with clothing purse cell phone ( I [employer] ) cell phone charge and person c pap. Patient wheeled down to car where family drove her home.

## 2020-11-12 NOTE — CARE UPDATE
11/11/20 2052   Patient Assessment/Suction   Level of Consciousness (AVPU) alert   Respiratory Effort Normal;Unlabored   Expansion/Accessory Muscles/Retractions no use of accessory muscles;no retractions;expansion symmetric   All Lung Fields Breath Sounds equal bilaterally;clear   Rhythm/Pattern, Respiratory unlabored;pattern regular;depth regular   Cough Frequency no cough   PRE-TX-O2   O2 Device (Oxygen Therapy) room air   SpO2 98 %   Pulse Oximetry Type Intermittent   $ Pulse Oximetry - Multiple Charge Pulse Oximetry - Multiple   Pulse 70   Resp 19   Aerosol Therapy   $ Aerosol Therapy Charges PRN treatment not required   Preset CPAP/BiPAP Settings   Mode Of Delivery CPAP;Standby   Respiratory Evaluation   $ Care Plan Tech Time 15 min

## 2020-11-12 NOTE — HOSPITAL COURSE
40-year-old  female with hypertension, diabetes mellitus 2, obstructive sleep apnea on CPAP, CAD admitted for chest pain.  Patient reported that she was in usual health 3 days ago only some developed mild pressure-like left-sided chest pain radiating to her back that is relieved with nitroglycerin.  Denied shortness of breath, diaphoresis, nausea, vomiting.  Also notice swelling of the lower extremities.  Symptoms became worse the next 2 days.  She could not put her shoes on.  She called the cardiology office today and was instructed to come to the ED for further evaluation.     Interval History: Patient is doing well.  Family present at bedside.No concerns/issues overnight reported by the patient or the nursing staff.   Chest pain  - ACS ruled out  - troponins trended negative  - echo pending  - cardiology consulted

## 2020-11-12 NOTE — DISCHARGE SUMMARY
[facility] Medicine  Discharge Summary      Patient Name: Marilyn Madera  MRN: [id number]  Admission Date: 11/10/2020  Hospital Length of Stay: 0 days  Discharge Date and Time:  11/12/2020 10:38 AM  Attending Physician: Kalli Grullon MD   Discharging Provider: Kalli Grullon MD  Primary Care Provider: FLORES Chiang-SHAHZAD      HPI:   40-year-old  female with hypertension, diabetes mellitus 2, obstructive sleep apnea on CPAP, CAD admitted for chest pain.  Patient reported that she was in usual health 3 days ago only some developed mild pressure-like left-sided chest pain radiating to her back that is relieved with nitroglycerin.  Denied shortness of breath, diaphoresis, nausea, vomiting.  Also notice swelling of the lower extremities.  Symptoms became worse the next 2 days.  She could not put her shoes on.  She called the cardiology office and was instructed to come to the ED for further evaluation. In the ED, patient was hemodynamically stable.  EKG and initial troponin were unremarkable.    Hospital Course:   Troponins trended negative and ACS was ruled out.  An echo was done which showed moderate left ventricular concentric hypertrophy. The left ventricle is normal in size with hyperdynamic systolic function. The estimated ejection fraction is 75%. Normal left ventricular diastolic function. Normal right ventricular size with normal right ventricular systolic function.  Cardiology was consulted and patient was seen and prior to discharge chest pain had resolved.    Physical examination on discharge  Constitutional: No distress.   HENT: NC  Head: Atraumatic.   Cardiovascular: Normal rate, regular rhythm and normal heart sounds.   Pulmonary/Chest: Effort normal. No wheezes.   Abdominal: Soft. Bowel sounds are normal. No distension and no mass. No tenderness  Neurological: Alert.   Skin: Skin is warm and dry.   Psych: Appropriate mood and affect    Consults:   Consults (From  admission, onward)        Status Ordering Provider     Inpatient consult to Cardiology  Once     Provider:  Lenard Corona MD    Acknowledged ELLIOT CHANG          No new Assessment & Plan notes have been filed under this hospital service since the last note was generated.  Service: Hospital Medicine    Final Active Diagnoses:    Diagnosis Date Noted POA    Coronary artery disease of native artery of native heart with stable angina pectoris [I25.118] 12/09/2019 Yes     Chronic    DM (diabetes mellitus), type 2 [E11.9] 11/30/2019 Yes     Chronic    Hypertension [I10] 11/30/2019 Yes     Chronic      Problems Resolved During this Admission:    Diagnosis Date Noted Date Resolved POA    PRINCIPAL PROBLEM:  Chest pain [R07.9] 11/10/2020 11/12/2020 Yes       Discharged Condition: good    Disposition: Home or Self Care    Follow Up:  Follow-up Information     RAGINI Chiang In 1 week.    Specialty: Family Medicine  Contact information:  [address]  [address]  [facility]  [place] [address]  [phone]             Lenard Corona MD In 1 week.    Specialties: Cardiovascular Disease, Cardiology, INTERVENTIONAL CARDIOLOGY  Contact information:  [address]  [address]  [place] [address]  [phone]                 Patient Instructions:      Diet diabetic     Activity as tolerated       Significant Diagnostic Studies: Labs:   BMP:   Recent Labs   Lab 11/10/20  1921 11/11/20  0120 11/12/20  0317   * 209* 264*    140 135*   K 3.7 3.7 3.8    105 101   CO2 25 23 24   BUN 15 17 20   CREATININE 0.8 0.7 0.9   CALCIUM 9.4 9.2 9.1   MG  --  1.9 1.9   , CMP   Recent Labs   Lab 11/10/20  1921 11/11/20  0120 11/12/20  0317    140 135*   K 3.7 3.7 3.8    105 101   CO2 25 23 24   * 209* 264*   BUN 15 17 20   CREATININE 0.8 0.7 0.9   CALCIUM 9.4 9.2 9.1   PROT 7.4  --   --    ALBUMIN 3.8  --   --    BILITOT 0.4  --   --    ALKPHOS 126  --   --    AST 17  --    --    ALT 22  --   --    ANIONGAP 12 12 10   ESTGFRAFRICA >60.0 >60.0 >60.0   EGFRNONAA >60.0 >60.0 >60.0   , CBC   Recent Labs   Lab 11/10/20  1921 11/12/20  0317   WBC 5.32 4.91   HGB 10.9* 10.2*   HCT 35.9* 33.6*    279   , Lipid Panel   Lab Results   Component Value Date    CHOL 122 11/11/2020    HDL 20 (L) 11/11/2020    LDLCALC 47.8 (L) 11/11/2020    TRIG 271 (H) 11/11/2020    CHOLHDL 16.4 (L) 11/11/2020   , Troponin   Recent Labs   Lab 11/10/20  1921 11/11/20  0120 11/11/20  0653   TROPONINI <0.030 <0.030 <0.030    and A1C:   Recent Labs   Lab 11/11/20  0120   HGBA1C 8.4*     Radiology: X-Ray: CXR: X-Ray Chest 1 View (CXR): No results found for this visit on 11/10/20. and X-Ray Chest PA and Lateral (CXR): No results found for this visit on 11/10/20.  MRI:     CT scan: CT ABDOMEN PELVIS WITH CONTRAST: No results found for this visit on 11/10/20. and CT ABDOMEN PELVIS WITHOUT CONTRAST: No results found for this visit on 11/10/20.  Nuclear Medicine:       Pending Diagnostic Studies:     None         Medications:  Reconciled Home Medications:      Medication List      CHANGE how you take these medications    aspirin 81 MG EC tablet  Commonly known as: ECOTRIN  Take 1 tablet by mouth once daily  What changed: when to take this     atorvastatin 80 MG tablet  Commonly known as: LIPITOR  Take 1 tablet (80 mg total) by mouth once daily.  What changed: when to take this     metoprolol succinate 50 MG 24 hr tablet  Commonly known as: TOPROL-XL  Take 1 tablet (50 mg total) by mouth once daily.  What changed: when to take this        CONTINUE taking these medications    amLODIPine 10 MG tablet  Commonly known as: NORVASC  Take 10 mg by mouth every evening.     clopidogreL 75 mg tablet  Commonly known as: PLAVIX  Take 1 tablet by mouth once daily.     isosorbide mononitrate 60 MG 24 hr tablet  Commonly known as: IMDUR  Take 60 mg by mouth every evening.     lisinopriL-hydrochlorothiazide 20-12.5 mg per  tablet  Commonly known as: PRINZIDE,ZESTORETIC  Take 1 tablet by mouth 2 (two) times a day.     metFORMIN 500 MG tablet  Commonly known as: GLUCOPHAGE  Take 500 mg by mouth 2 (two) times daily with meals.     nitroGLYCERIN 0.4 MG SL tablet  Commonly known as: NITROSTAT  Take 1 tablet by mouth every 5 (five) minutes as needed for Chest pain.     omeprazole 40 MG capsule  Commonly known as: PRILOSEC  Take 40 mg by mouth once daily.     ranolazine 500 MG Tb12  Commonly known as: RANEXA  Take 500 mg by mouth 2 (two) times daily.            Indwelling Lines/Drains at time of discharge:   Lines/Drains/Airways     None                 Time spent on the discharge of patient: 30 minutes  Patient was seen and examined on the date of discharge and determined to be suitable for discharge.         Kalli Grullon MD  Department of Hospital Medicine  [facility]

## 2020-11-12 NOTE — PROGRESS NOTES
[facility]  Department of Cardiology  Consult Note    PATIENT NAME: Marilyn Madera  MRN: [id number]  TODAY'S DATE: 11/12/2020  ADMIT DATE: 11/10/2020    SUBJECTIVE     40-year-old  female with hypertension, diabetes mellitus 2, obstructive sleep apnea on CPAP, CAD admitted for chest pain.  Patient reported that she was in usual health 3 days ago only some developed mild pressure-like left-sided chest pain radiating to her back that is relieved with nitroglycerin.  Denied shortness of breath, diaphoresis, nausea, vomiting.  Also notice swelling of the lower extremities.  Symptoms became worse the next 2 days.  She could not put her shoes on.  She called the cardiology office and was instructed to come to the ED for further evaluation. In the ED, patient was hemodynamically stable.  EKG and initial troponin were unremarkable.    PRINCIPLE PROBLEM: Chest pain    INTERVAL HISTORY:    11/12/2020   Patient is a 40-year-old lady presented with history of hypertension diabetes and history of coronary artery disease.  Patient has been noncompliant with the health and with instruction.  Patient has been smoking and eating and doing the usual things she has been doing for long period of time.  Patient has severe multivessel coronary artery disease not amenable to percutaneous intervention or coronary artery bypass grafting.  Patient is currently being treated medically.  Patient stated that her shortness of breath was gradually getting worse and 3 days prior to the admission she had developed mild chest discomfort and she also fell significantly short winded and had a exertional shortness of breath and had difficulty lying down.  At which time she decided come to the emergency room.  Patient is awake alert breathing much better.  She is walking in the room has no chest discomfort or no shortness of breath.  Patient is all dressed up anxious to go home.  Patient at the present time is comfortable not any  acute distress.  Her breathing has improved with the Lasix that was given yesterday.      Review of patient's allergies indicates:   Allergen Reactions    Codeine Nausea Only and Nausea And Vomiting    Morphine Anaphylaxis and Rash       REVIEW OF SYSTEMS  CARDIOVASCULAR:  With shortness of breath she had mild chest discomfort.  RESPIRATORY:  Increased shortness of breath and difficulty in breathing.  : No blood in the urine  GI: No Nausea, vomiting, constipation, diarrhea, blood, or reflux.  MUSCULOSKELETAL: No myalgias  NEURO: No lightheadedness or dizziness  EYES: No Double vision, blurry, vision or headache     OBJECTIVE     VITAL SIGNS (Most Recent)  Temp: 97.6 °F (36.4 °C) (11/12/20 0806)  Pulse: (!) 56 (11/12/20 0814)  Resp: 18 (11/12/20 0814)  BP: (!) 120/58 (11/12/20 0806)  SpO2: 96 % (11/12/20 0814)    VENTILATION STATUS  Resp: 18 (11/12/20 0814)  SpO2: 96 % (11/12/20 0814)       I & O (Last 24H):    Intake/Output Summary (Last 24 hours) at 11/12/2020 1004  Last data filed at 11/11/2020 1700  Gross per 24 hour   Intake 60 ml   Output --   Net 60 ml       WEIGHTS  Wt Readings from Last 1 Encounters:   11/11/20 0109 122.3 kg (269 lb 10 oz)   11/10/20 1841 101.2 kg (223 lb)       PHYSICAL EXAM  CONSTITUTIONAL: Well built, well nourished in no apparent distress  NECK: no carotid bruit, no JVD  LUNGS:  Bilateral decreased breath sounds in both lung bases.  CHEST WALL: no tenderness  HEART: regular rate and rhythm, S1, S2 normal, no murmur, click, rub or gallop   ABDOMEN: soft, bowel sounds are  EXTREMITIES: Extremities normal, no edema  NEURO: AAO X 3    SCHEDULED MEDS:   amLODIPine  10 mg Oral QHS    aspirin  81 mg Oral QHS    atorvastatin  80 mg Oral QHS    clopidogreL  75 mg Oral Daily    enoxaparin  40 mg Subcutaneous Q12H    furosemide  20 mg Oral Daily    hydroCHLOROthiazide  12.5 mg Oral Daily    isosorbide mononitrate  60 mg Oral QHS    lisinopriL  20 mg Oral Daily    metoprolol succinate   50 mg Oral QHS    pantoprazole  40 mg Oral Daily    ranolazine  1,000 mg Oral QHS       CONTINUOUS INFUSIONS:    PRN MEDS:albuterol-ipratropium, butalbital-acetaminophen-caffeine -40 mg, calcium chloride IVPB, calcium chloride IVPB, calcium chloride IVPB, dextrose 50%, dextrose 50%, insulin regular, magnesium oxide, magnesium sulfate IVPB, magnesium sulfate IVPB, magnesium sulfate IVPB, magnesium sulfate IVPB, melatonin, ondansetron, polyethylene glycol, potassium chloride in water, potassium chloride in water, potassium chloride in water, potassium chloride in water, potassium chloride, potassium chloride, potassium chloride, potassium chloride, sodium chloride 0.9%, sodium phosphate IVPB, sodium phosphate IVPB, sodium phosphate IVPB, sodium phosphate IVPB, sodium phosphate IVPB    LABS AND DIAGNOSTICS     CBC LAST 3 DAYS  Recent Labs   Lab 11/10/20  1921 11/12/20  0317   WBC 5.32 4.91   RBC 4.58 4.27   HGB 10.9* 10.2*   HCT 35.9* 33.6*   MCV 78* 79*   MCH 23.8* 23.9*   MCHC 30.4* 30.4*   RDW 16.9* 17.1*    279   MPV 9.2 9.2   GRAN 64.2  3.4  --    LYMPH 24.8  1.3  --    MONO 5.6  0.3  --    BASO 0.02  --    NRBC 0  --        COAGULATION LAST 3 DAYS  Recent Labs   Lab 11/10/20  1921   LABPT 13.1   INR 1.0       CHEMISTRY LAST 3 DAYS  Recent Labs   Lab 11/10/20  1921 11/11/20  0120 11/12/20  0317    140 135*   K 3.7 3.7 3.8    105 101   CO2 25 23 24   ANIONGAP 12 12 10   BUN 15 17 20   CREATININE 0.8 0.7 0.9   * 209* 264*   CALCIUM 9.4 9.2 9.1   MG  --  1.9 1.9   ALBUMIN 3.8  --   --    PROT 7.4  --   --    ALKPHOS 126  --   --    ALT 22  --   --    AST 17  --   --    BILITOT 0.4  --   --        CARDIAC PROFILE LAST 3 DAYS  Recent Labs   Lab 11/10/20  1921 11/11/20  0120 11/11/20  0653   BNP 71  --   --    TROPONINI <0.030 <0.030 <0.030       ENDOCRINE LAST 3 DAYS  No results for input(s): TSH, PROCAL in the last 168 hours.    LAST ARTERIAL BLOOD GAS  ABG  No results for  input(s): PH, PO2, PCO2, HCO3, BE in the last 168 hours.    LAST 7 DAYS MICROBIOLOGY   Microbiology Results (last 7 days)     ** No results found for the last 168 hours. **          MOST RECENT IMAGING  Echo Color Flow Doppler? Yes  · There is moderate left ventricular concentric hypertrophy.  · The left ventricle is normal in size with hyperdynamic systolic   function. The estimated ejection fraction is 75%.  · Normal left ventricular diastolic function.  · Normal right ventricular size with normal right ventricular systolic   function.  · Intermediate central venous pressure (8 mmHg).         LASTECHO  Results for orders placed during the hospital encounter of 11/10/20   Echo Color Flow Doppler? Yes    Narrative · There is moderate left ventricular concentric hypertrophy.  · The left ventricle is normal in size with hyperdynamic systolic   function. The estimated ejection fraction is 75%.  · Normal left ventricular diastolic function.  · Normal right ventricular size with normal right ventricular systolic   function.  · Intermediate central venous pressure (8 mmHg).          CURRENT/PREVIOUS VISIT EKG  Results for orders placed or performed during the hospital encounter of 11/10/20   EKG 12-lead    Collection Time: 11/10/20  6:51 PM    Narrative    Test Reason : R06.02,    Vent. Rate : 087 BPM     Atrial Rate : 087 BPM     P-R Int : 170 ms          QRS Dur : 108 ms      QT Int : 418 ms       P-R-T Axes : 064 020 063 degrees     QTc Int : 502 ms    Normal sinus rhythm  Septal infarct ,age undetermined  Prolonged QT  Abnormal ECG  When compared with ECG of 27-DEC-2019 10:06,  Previous ECG has undetermined rhythm, needs review  T wave inversion no longer evident in Lateral leads  QT has lengthened    Referred By: [email or web address]   SELF           Confirmed By:        ASSESSMENT/PLAN:     Active Hospital Problems    Diagnosis    *Chest pain    Coronary artery disease of native artery of native heart with stable angina  pectoris    DM (diabetes mellitus), type 2    Hypertension       ASSESSMENT & PLAN:   1.  Congestive heart failure acutely decompensated  2.  Acute diastolic and systolic heart failure  3.  Severe multivessel coronary artery disease  4.  Uncontrolled diabetes mellitus.  5.  Noncompliant with diet  6.  Active smoker      RECOMMENDATIONS:  1.  Patient has done very well since she was given Lasix she has diuresed significantly and lost lot of fluid and she has been urinating since she got admitted to the hospital.  2.  Lasix has been effective to her.  Discussed with patient that she needs to take her medication on a regular basis.  3.  Patient to continue her medications including aspirin and Plavix.  4.  Patient is very anxious to go home will discharge and I will see her back in the office in 2-3 weeks time.  5.  Discussed with patient again in regards with compliance and discussed with the mother also.        Lenard Corona MD  [facility]  Department of Cardiology  Date of Service: 11/12/2020  10:04 AM

## 2020-11-13 ENCOUNTER — TELEPHONE (OUTPATIENT)
Dept: CARDIOLOGY | Facility: CLINIC | Age: 40
End: 2020-11-13

## 2020-11-13 ENCOUNTER — PATIENT MESSAGE (OUTPATIENT)
Dept: CARDIOLOGY | Facility: CLINIC | Age: 40
End: 2020-11-13

## 2020-11-13 NOTE — TELEPHONE ENCOUNTER
----- Message from Juliana Anguiano sent at 11/13/2020  4:11 PM CST -----  please return patient call [phone]

## 2020-11-13 NOTE — TELEPHONE ENCOUNTER
Needs a letter to be able to return to work. He would like emailed to her that she is able to work.

## 2020-11-18 ENCOUNTER — TELEPHONE (OUTPATIENT)
Dept: CARDIOLOGY | Facility: CLINIC | Age: 40
End: 2020-11-18

## 2020-12-17 DIAGNOSIS — I25.118 ATHEROSCLEROSIS OF NATIVE CORONARY ARTERY OF NATIVE HEART WITH STABLE ANGINA PECTORIS: ICD-10-CM

## 2020-12-17 RX ORDER — LISINOPRIL AND HYDROCHLOROTHIAZIDE 12.5; 2 MG/1; MG/1
1 TABLET ORAL 2 TIMES DAILY
Qty: 180 TABLET | Refills: 3 | Status: SHIPPED | OUTPATIENT
Start: 2020-12-17 | End: 2021-10-04

## 2020-12-17 RX ORDER — METOPROLOL SUCCINATE 50 MG/1
50 TABLET, EXTENDED RELEASE ORAL NIGHTLY
Qty: 90 TABLET | Refills: 3 | Status: SHIPPED | OUTPATIENT
Start: 2020-12-17 | End: 2021-12-17

## 2020-12-17 NOTE — TELEPHONE ENCOUNTER
----- Message from Jen Mahoney sent at 12/17/2020  8:34 AM CST -----  [facility]     metoprolol succinate (TOPROL-XL) 50 MG 24 hr tablet  lisinopril-hydrochlorothiazide (PRINZIDE,ZESTORETIC) 20-12.5 mg per tablet

## 2021-01-15 ENCOUNTER — TELEPHONE (OUTPATIENT)
Dept: CARDIOLOGY | Facility: CLINIC | Age: 41
End: 2021-01-15

## 2021-01-25 RX ORDER — METFORMIN HYDROCHLORIDE 500 MG/1
500 TABLET ORAL 2 TIMES DAILY WITH MEALS
Qty: 60 TABLET | Refills: 5 | Status: SHIPPED | OUTPATIENT
Start: 2021-01-25 | End: 2021-08-01

## 2021-03-18 ENCOUNTER — CLINICAL SUPPORT (OUTPATIENT)
Dept: CARDIAC REHAB | Facility: HOSPITAL | Age: 41
End: 2021-03-18
Payer: COMMERCIAL

## 2021-03-18 DIAGNOSIS — Z95.1 S/P CABG (CORONARY ARTERY BYPASS GRAFT): ICD-10-CM

## 2021-03-18 PROCEDURE — 93797 PHYS/QHP OP CAR RHAB WO ECG: CPT

## 2021-03-26 ENCOUNTER — TELEPHONE (OUTPATIENT)
Dept: CARDIAC REHAB | Facility: HOSPITAL | Age: 41
End: 2021-03-26

## 2021-05-26 ENCOUNTER — TELEPHONE (OUTPATIENT)
Dept: FAMILY MEDICINE | Facility: CLINIC | Age: 41
End: 2021-05-26

## 2022-07-16 ENCOUNTER — HOSPITAL ENCOUNTER (EMERGENCY)
Facility: HOSPITAL | Age: 42
Discharge: HOME | End: 2022-07-16
Attending: EMERGENCY MEDICINE
Payer: COMMERCIAL

## 2022-07-16 VITALS
OXYGEN SATURATION: 97 % | WEIGHT: 269 LBS | SYSTOLIC BLOOD PRESSURE: 139 MMHG | RESPIRATION RATE: 18 BRPM | HEART RATE: 80 BPM | HEIGHT: 62 IN | TEMPERATURE: 98 F | BODY MASS INDEX: 49.5 KG/M2 | DIASTOLIC BLOOD PRESSURE: 78 MMHG

## 2022-07-16 DIAGNOSIS — U07.1 COVID-19 VIRUS DETECTED: ICD-10-CM

## 2022-07-16 DIAGNOSIS — U07.1 COVID: Primary | ICD-10-CM

## 2022-07-16 LAB
INFLUENZA A, MOLECULAR: NEGATIVE
INFLUENZA B, MOLECULAR: NEGATIVE
SARS-COV-2 RDRP RESP QL NAA+PROBE: POSITIVE
SPECIMEN SOURCE: NORMAL

## 2022-07-16 PROCEDURE — 96374 THER/PROPH/DIAG INJ IV PUSH: CPT

## 2022-07-16 PROCEDURE — 63600175 PHARM REV CODE 636 W HCPCS: Performed by: EMERGENCY MEDICINE

## 2022-07-16 PROCEDURE — 25000003 PHARM REV CODE 250: Performed by: EMERGENCY MEDICINE

## 2022-07-16 PROCEDURE — U0002 COVID-19 LAB TEST NON-CDC: HCPCS | Performed by: EMERGENCY MEDICINE

## 2022-07-16 PROCEDURE — 96361 HYDRATE IV INFUSION ADD-ON: CPT

## 2022-07-16 PROCEDURE — 99283 EMERGENCY DEPT VISIT LOW MDM: CPT | Mod: 25

## 2022-07-16 PROCEDURE — 87502 INFLUENZA DNA AMP PROBE: CPT | Performed by: EMERGENCY MEDICINE

## 2022-07-16 RX ORDER — HYDROCODONE POLISTIREX AND CHLORPHENIRAMINE POLISTIREX 10; 8 MG/5ML; MG/5ML
5 SUSPENSION, EXTENDED RELEASE ORAL EVERY 12 HOURS PRN
Qty: 70 ML | Refills: 0 | Status: SHIPPED | OUTPATIENT
Start: 2022-07-16 | End: 2022-07-26

## 2022-07-16 RX ORDER — BUTALBITAL, ACETAMINOPHEN AND CAFFEINE 50; 325; 40 MG/1; MG/1; MG/1
1 TABLET ORAL
Status: COMPLETED | OUTPATIENT
Start: 2022-07-16 | End: 2022-07-16

## 2022-07-16 RX ORDER — KETOROLAC TROMETHAMINE 30 MG/ML
15 INJECTION, SOLUTION INTRAMUSCULAR; INTRAVENOUS
Status: COMPLETED | OUTPATIENT
Start: 2022-07-16 | End: 2022-07-16

## 2022-07-16 RX ORDER — ACETAMINOPHEN 325 MG/1
325 TABLET ORAL
Status: COMPLETED | OUTPATIENT
Start: 2022-07-16 | End: 2022-07-16

## 2022-07-16 RX ADMIN — SODIUM CHLORIDE, SODIUM LACTATE, POTASSIUM CHLORIDE, AND CALCIUM CHLORIDE 1000 ML: .6; .31; .03; .02 INJECTION, SOLUTION INTRAVENOUS at 04:07

## 2022-07-16 RX ADMIN — KETOROLAC TROMETHAMINE 15 MG: 30 INJECTION, SOLUTION INTRAMUSCULAR; INTRAVENOUS at 04:07

## 2022-07-16 RX ADMIN — ACETAMINOPHEN 325 MG: 325 TABLET ORAL at 04:07

## 2022-07-16 RX ADMIN — BUTALBITAL, ACETAMINOPHEN, AND CAFFEINE 1 TABLET: 50; 325; 40 TABLET ORAL at 04:07

## 2022-07-16 NOTE — Clinical Note
"Marilyn"Анна Madera was seen and treated in our emergency department on 7/16/2022.     COVID-19 is present in our communities across the state. There is limited testing for COVID at this time, so not all patients can be tested. In this situation, your employee meets the following criteria:    Marilyn Madera has met the criteria for COVID-19 testing and has a POSITIVE result. She can return to work once they are asymptomatic for 24 hours without the use of fever reducing medications AND at least five days from the first positive result. A mask is recommended for 5 days post quarantine.     If you have any questions or concerns, or if I can be of further assistance, please do not hesitate to contact me.    Sincerely,              RN"

## 2022-07-16 NOTE — Clinical Note
"Marilyn Vidales"Santy was seen and treated in our emergency department on 7/16/2022.  She may return to work on 07/22/2022.  Marilyn Madera is COVID positive and can return to work after 5 days of isolation.     If you have any questions or concerns, please don't hesitate to call.       RN    "

## 2022-07-16 NOTE — ED PROVIDER NOTES
Encounter Date: 2022    SCRIBE #1 NOTE: IPhill, am scribing for, and in the presence of, Earnest Fritz III, MD.       History     Chief Complaint   Patient presents with    Headache     X 3 days     Cough     Negative home covid test  today      Time seen by provider: 3:59 PM on 2022    Marilyn Madera is a 42 y.o. female who presents to the ED with a headache and muscle aches. The Pt states that she has had symptoms for the past 3 days and also c/o generalized weakness. She states she had a negative home COVID-19 test today. She describes the symptoms as similar to past CHF episodes. She is a diabetic and states her sugars were 98 this morning. The patient denies any other symptoms at this time. PMHx of CHF, DM, and HTN. PSHx of cardiac catheterization.      The history is provided by the patient.     Review of patient's allergies indicates:   Allergen Reactions    Codeine Nausea Only and Nausea And Vomiting    Morphine Anaphylaxis and Rash     Past Medical History:   Diagnosis Date    CHF (congestive heart failure)     Diabetes mellitus     Hypertension      Past Surgical History:   Procedure Laterality Date    CARDIAC CATHETERIZATION  2019    CHOLECYSTECTOMY      LEFT HEART CATHETERIZATION Left 2019    Procedure: CATHETERIZATION, HEART, LEFT;  Surgeon: Lenard Corona MD;  Location: [facility] CATH/EP LAB;  Service: Cardiology;  Laterality: Left;    TUBAL LIGATION       Family History   Problem Relation Age of Onset    Diabetes Father     Hypertension Father     Hypertension Mother     Heart disease Paternal Grandmother      Social History     Tobacco Use    Smoking status: Former Smoker     Packs/day: 0.25     Quit date: 2019     Years since quittin.6    Smokeless tobacco: Never Used    Tobacco comment: 3 - 4 cigs a day   Substance Use Topics    Alcohol use: Not Currently     Comment: social    Drug use: Never     Review of Systems   Constitutional:  Negative for activity change, appetite change, chills, fatigue and fever.   Eyes: Negative for visual disturbance.   Respiratory: Negative for apnea and shortness of breath.    Cardiovascular: Negative for chest pain and palpitations.   Gastrointestinal: Negative for abdominal distention and abdominal pain.   Genitourinary: Negative for difficulty urinating.   Musculoskeletal: Positive for myalgias. Negative for neck pain.   Skin: Negative for pallor and rash.   Neurological: Positive for weakness and headaches.   Hematological: Does not bruise/bleed easily.   Psychiatric/Behavioral: Negative for agitation.       Physical Exam     Initial Vitals [07/16/22 1542]   BP Pulse Resp Temp SpO2   135/76 99 18 97.6 °F (36.4 °C) 98 %      MAP       --         Physical Exam    Nursing note and vitals reviewed.  Constitutional: She appears well-developed and well-nourished.   HENT:   Head: Normocephalic and atraumatic.   Eyes: Conjunctivae are normal.   Neck: Neck supple.   Normal range of motion.  Cardiovascular: Normal rate, regular rhythm and normal heart sounds. Exam reveals no gallop and no friction rub.    No murmur heard.  Pulmonary/Chest: Effort normal and breath sounds normal. No respiratory distress. She has no wheezes. She has no rhonchi. She has no rales.   Abdominal: Abdomen is soft. She exhibits no distension. There is no abdominal tenderness.   Musculoskeletal:         General: Normal range of motion.      Cervical back: Normal range of motion and neck supple.     Neurological: She is alert and oriented to person, place, and time.   Skin: Skin is warm and dry. No erythema.   Psychiatric: She has a normal mood and affect.         ED Course   Procedures  Labs Reviewed   SARS-COV-2 RNA AMPLIFICATION, QUAL - Abnormal; Notable for the following components:       Result Value    SARS-CoV-2 RNA, Amplification, Qual Positive (*)     All other components within normal limits   INFLUENZA A & B BY MOLECULAR          Imaging  Results    None          Medications   lactated ringers bolus 1,000 mL (0 mLs Intravenous Stopped 7/16/22 1731)   butalbital-acetaminophen-caffeine -40 mg per tablet 1 tablet (1 tablet Oral Given 7/16/22 1625)   acetaminophen tablet 325 mg (325 mg Oral Given 7/16/22 1625)   ketorolac injection 15 mg (15 mg Intravenous Given 7/16/22 1639)     Medical Decision Making:   History:   Old Medical Records: I decided to obtain old medical records.  Clinical Tests:   Lab Tests: Ordered and Reviewed  ED Management:  42-year-old female presents with a 3 day history of myalgias cough and congestion and is found have COVID.  She has no evidence of COVID pneumonia.       APC / Resident Notes:   I, Dr. Earnest Fritz III, personally performed the services described in this documentation. All medical record entries made by the scribe were at my direction and in my presence.  I have reviewed the chart and agree that the record reflects my personal performance and is accurate and complete     Scribe Attestation:   Scribe #1: I performed the above scribed service and the documentation accurately describes the services I performed. I attest to the accuracy of the note.                 Clinical Impression:   Final diagnoses:  [U07.1] COVID (Primary)          ED Disposition Condition    Discharge Stable        ED Prescriptions     Medication Sig Dispense Start Date End Date Auth. Provider    hydrocodone-chlorpheniramine (TUSSIONEX) 10-8 mg/5 mL suspension Take 5 mLs by mouth every 12 (twelve) hours as needed for Cough. 70 mL 7/16/2022 7/26/2022 Earnest Fritz III, MD        Follow-up Information     Follow up With Specialties Details Why Contact Info    Barb Hadley, FNP-C Family Medicine In 1 week  [address] [facility] [place] [address]  [phone]             Earnest Fritz III, MD  07/16/22 3325

## 2022-09-21 ENCOUNTER — HOSPITAL ENCOUNTER (EMERGENCY)
Facility: HOSPITAL | Age: 42
Discharge: HOME | End: 2022-09-21
Attending: EMERGENCY MEDICINE
Payer: COMMERCIAL

## 2022-09-21 VITALS
BODY MASS INDEX: 49.49 KG/M2 | HEIGHT: 62 IN | SYSTOLIC BLOOD PRESSURE: 150 MMHG | OXYGEN SATURATION: 100 % | DIASTOLIC BLOOD PRESSURE: 78 MMHG | TEMPERATURE: 98 F | HEART RATE: 80 BPM | RESPIRATION RATE: 16 BRPM | WEIGHT: 268.94 LBS

## 2022-09-21 DIAGNOSIS — R53.1 GENERALIZED WEAKNESS: ICD-10-CM

## 2022-09-21 DIAGNOSIS — R73.9 HYPERGLYCEMIA: Primary | ICD-10-CM

## 2022-09-21 LAB
ALBUMIN SERPL BCP-MCNC: 3.9 G/DL (ref 3.5–5.2)
ALLENS TEST: ABNORMAL
ALP SERPL-CCNC: 173 U/L (ref 55–135)
ALT SERPL W/O P-5'-P-CCNC: 45 U/L (ref 10–44)
ANION GAP SERPL CALC-SCNC: 14 MMOL/L (ref 8–16)
AST SERPL-CCNC: 36 U/L (ref 10–40)
B-OH-BUTYR BLD STRIP-SCNC: 0.2 MMOL/L (ref 0–0.5)
BACTERIA #/AREA URNS HPF: NORMAL /HPF
BASOPHILS # BLD AUTO: 0.04 K/UL (ref 0–0.2)
BASOPHILS NFR BLD: 0.9 % (ref 0–1.9)
BILIRUB SERPL-MCNC: 0.9 MG/DL (ref 0.1–1)
BILIRUB UR QL STRIP: NEGATIVE
BUN SERPL-MCNC: 6 MG/DL (ref 6–20)
CALCIUM SERPL-MCNC: 9.5 MG/DL (ref 8.7–10.5)
CHLORIDE SERPL-SCNC: 93 MMOL/L (ref 95–110)
CLARITY UR: CLEAR
CO2 SERPL-SCNC: 23 MMOL/L (ref 23–29)
COLOR UR: YELLOW
CREAT SERPL-MCNC: 1.1 MG/DL (ref 0.5–1.4)
DELSYS: ABNORMAL
DIFFERENTIAL METHOD: ABNORMAL
EOSINOPHIL # BLD AUTO: 0.1 K/UL (ref 0–0.5)
EOSINOPHIL NFR BLD: 1.5 % (ref 0–8)
ERYTHROCYTE [DISTWIDTH] IN BLOOD BY AUTOMATED COUNT: 14.8 % (ref 11.5–14.5)
EST. GFR  (NO RACE VARIABLE): >60 ML/MIN/1.73 M^2
GLUCOSE SERPL-MCNC: 597 MG/DL (ref 70–110)
GLUCOSE UR QL STRIP: ABNORMAL
HCO3 UR-SCNC: 28 MMOL/L (ref 24–28)
HCT VFR BLD AUTO: 41.5 % (ref 37–48.5)
HGB BLD-MCNC: 13.7 G/DL (ref 12–16)
HGB UR QL STRIP: ABNORMAL
IMM GRANULOCYTES # BLD AUTO: 0.02 K/UL (ref 0–0.04)
IMM GRANULOCYTES NFR BLD AUTO: 0.4 % (ref 0–0.5)
KETONES UR QL STRIP: NEGATIVE
LEUKOCYTE ESTERASE UR QL STRIP: NEGATIVE
LYMPHOCYTES # BLD AUTO: 1.5 K/UL (ref 1–4.8)
LYMPHOCYTES NFR BLD: 33.1 % (ref 18–48)
MCH RBC QN AUTO: 26.2 PG (ref 27–31)
MCHC RBC AUTO-ENTMCNC: 33 G/DL (ref 32–36)
MCV RBC AUTO: 79 FL (ref 82–98)
MICROSCOPIC COMMENT: NORMAL
MONOCYTES # BLD AUTO: 0.3 K/UL (ref 0.3–1)
MONOCYTES NFR BLD: 6.8 % (ref 4–15)
NEUTROPHILS # BLD AUTO: 2.6 K/UL (ref 1.8–7.7)
NEUTROPHILS NFR BLD: 57.3 % (ref 38–73)
NITRITE UR QL STRIP: NEGATIVE
NRBC BLD-RTO: 0 /100 WBC
PCO2 BLDA: 49.9 MMHG (ref 35–45)
PH SMN: 7.36 [PH] (ref 7.35–7.45)
PH UR STRIP: 6 [PH] (ref 5–8)
PLATELET # BLD AUTO: 221 K/UL (ref 150–450)
PMV BLD AUTO: 10 FL (ref 9.2–12.9)
PO2 BLDA: 26 MMHG (ref 40–60)
POC BE: 3 MMOL/L
POC SATURATED O2: 45 % (ref 95–100)
POC TCO2: 29 MMOL/L (ref 24–29)
POCT GLUCOSE: 256 MG/DL (ref 70–110)
POCT GLUCOSE: 382 MG/DL (ref 70–110)
POCT GLUCOSE: 479 MG/DL (ref 70–110)
POTASSIUM SERPL-SCNC: 3.7 MMOL/L (ref 3.5–5.1)
PROT SERPL-MCNC: 7.7 G/DL (ref 6–8.4)
PROT UR QL STRIP: NEGATIVE
RBC # BLD AUTO: 5.23 M/UL (ref 4–5.4)
RBC #/AREA URNS HPF: 1 /HPF (ref 0–4)
SAMPLE: ABNORMAL
SITE: ABNORMAL
SODIUM SERPL-SCNC: 130 MMOL/L (ref 136–145)
SP GR UR STRIP: <=1.005 (ref 1–1.03)
URN SPEC COLLECT METH UR: ABNORMAL
UROBILINOGEN UR STRIP-ACNC: NEGATIVE EU/DL
WBC # BLD AUTO: 4.53 K/UL (ref 3.9–12.7)
WBC #/AREA URNS HPF: 0 /HPF (ref 0–5)
YEAST URNS QL MICRO: NORMAL

## 2022-09-21 PROCEDURE — 96374 THER/PROPH/DIAG INJ IV PUSH: CPT

## 2022-09-21 PROCEDURE — 96361 HYDRATE IV INFUSION ADD-ON: CPT

## 2022-09-21 PROCEDURE — 99900035 HC TECH TIME PER 15 MIN (STAT)

## 2022-09-21 PROCEDURE — 99284 EMERGENCY DEPT VISIT MOD MDM: CPT | Mod: 25

## 2022-09-21 PROCEDURE — 85025 COMPLETE CBC W/AUTO DIFF WBC: CPT | Performed by: PHYSICIAN ASSISTANT

## 2022-09-21 PROCEDURE — 63600175 PHARM REV CODE 636 W HCPCS: Performed by: EMERGENCY MEDICINE

## 2022-09-21 PROCEDURE — 82962 GLUCOSE BLOOD TEST: CPT

## 2022-09-21 PROCEDURE — 93010 EKG 12-LEAD: ICD-10-PCS | Mod: ,,, | Performed by: SPECIALIST

## 2022-09-21 PROCEDURE — 93010 ELECTROCARDIOGRAM REPORT: CPT | Mod: ,,, | Performed by: SPECIALIST

## 2022-09-21 PROCEDURE — 25000003 PHARM REV CODE 250: Performed by: PHYSICIAN ASSISTANT

## 2022-09-21 PROCEDURE — 36415 COLL VENOUS BLD VENIPUNCTURE: CPT | Performed by: PHYSICIAN ASSISTANT

## 2022-09-21 PROCEDURE — 81000 URINALYSIS NONAUTO W/SCOPE: CPT | Performed by: PHYSICIAN ASSISTANT

## 2022-09-21 PROCEDURE — 80053 COMPREHEN METABOLIC PANEL: CPT | Performed by: PHYSICIAN ASSISTANT

## 2022-09-21 PROCEDURE — 82010 KETONE BODYS QUAN: CPT | Performed by: PHYSICIAN ASSISTANT

## 2022-09-21 PROCEDURE — 93005 ELECTROCARDIOGRAM TRACING: CPT

## 2022-09-21 PROCEDURE — 25000003 PHARM REV CODE 250: Performed by: EMERGENCY MEDICINE

## 2022-09-21 PROCEDURE — 82803 BLOOD GASES ANY COMBINATION: CPT

## 2022-09-21 RX ORDER — ACETAMINOPHEN 500 MG
1000 TABLET ORAL
Status: COMPLETED | OUTPATIENT
Start: 2022-09-21 | End: 2022-09-21

## 2022-09-21 RX ORDER — METFORMIN HYDROCHLORIDE 1000 MG/1
1000 TABLET ORAL 2 TIMES DAILY WITH MEALS
Qty: 60 TABLET | Refills: 0 | Status: SHIPPED | OUTPATIENT
Start: 2022-09-21 | End: 2022-10-21

## 2022-09-21 RX ADMIN — ACETAMINOPHEN 1000 MG: 500 TABLET ORAL at 02:09

## 2022-09-21 RX ADMIN — SODIUM CHLORIDE, SODIUM LACTATE, POTASSIUM CHLORIDE, AND CALCIUM CHLORIDE 1000 ML: .6; .31; .03; .02 INJECTION, SOLUTION INTRAVENOUS at 02:09

## 2022-09-21 RX ADMIN — INSULIN HUMAN 10 UNITS: 100 INJECTION, SOLUTION PARENTERAL at 02:09

## 2022-09-21 RX ADMIN — SODIUM CHLORIDE 500 ML: 0.9 INJECTION, SOLUTION INTRAVENOUS at 12:09

## 2022-09-21 NOTE — ED PROVIDER NOTES
"Encounter Date: 9/21/2022    SCRIBE #1 NOTE: I, Moreliafranklin Velazquez, am scribing for, and in the presence of,  Caleb Velazquez MD.     History     Chief Complaint   Patient presents with    Hyperglycemia     Reports blood sugar of 387 this morning -- reports fatigue. Denies abd pain or vomiting.      Time seen by provider: 12:16 PM on 09/21/2022    Marilyn Madera is a 42 y.o. female who presents to the ED with an onset of general weakness, fatigue, dizziness, and excessive thirst that began today with elevated sugar level of 387 when it is usually 119. The patient reports that she has had fluctuating sugar levels for a week and she has not been eating as much because "everything tastes gross and her mouth and throat feel fuzzy." Patient believes her Metformin is not working as well as before and is not currently on insulin. Patient has been experiencing heart palpitations lately and is currently on Lisinopril. The patient denies fever, cough, congestion, burning with urination, blurry vision, neck pain, vomiting, diarrhea or any other symptoms at this time. She has a PMHx of HTN, diabetes, neuropathy, and CHF. She has a PSHx of cholecystectomy and cardiac catheterization. Patient was seen on July 16th for Covid and tested positive. She was discharged the same day.    The history is provided by the patient.   Review of patient's allergies indicates:   Allergen Reactions    Codeine Nausea Only and Nausea And Vomiting    Morphine Anaphylaxis and Rash     Past Medical History:   Diagnosis Date    CHF (congestive heart failure)     Diabetes mellitus     Hypertension      Past Surgical History:   Procedure Laterality Date    CARDIAC CATHETERIZATION  12/09/2019    CHOLECYSTECTOMY      LEFT HEART CATHETERIZATION Left 12/9/2019    Procedure: CATHETERIZATION, HEART, LEFT;  Surgeon: Lenard Corona MD;  Location: [facility] CATH/EP LAB;  Service: Cardiology;  Laterality: Left;    TUBAL LIGATION       Family History   Problem " Relation Age of Onset    Diabetes Father     Hypertension Father     Hypertension Mother     Heart disease Paternal Grandmother      Social History     Tobacco Use    Smoking status: Former     Packs/day: 0.25     Types: Cigarettes     Quit date: 2019     Years since quittin.7    Smokeless tobacco: Never    Tobacco comments:     3 - 4 cigs a day   Substance Use Topics    Alcohol use: Not Currently     Comment: social    Drug use: Never     Review of Systems   Constitutional:  Positive for fatigue. Negative for fever.   HENT:  Negative for congestion and sore throat.    Eyes:  Negative for visual disturbance.   Respiratory:  Negative for cough and shortness of breath.    Cardiovascular:  Positive for palpitations. Negative for chest pain.   Gastrointestinal:  Positive for nausea. Negative for diarrhea and vomiting.   Endocrine: Positive for polydipsia.   Genitourinary:  Negative for dysuria.   Musculoskeletal:  Negative for back pain and neck pain.   Skin:  Negative for rash.   Neurological:  Positive for dizziness and weakness (general).   Hematological:  Does not bruise/bleed easily.     Physical Exam     Initial Vitals [22 1103]   BP Pulse Resp Temp SpO2   (!) 165/94 84 17 98.3 °F (36.8 °C) 98 %      MAP       --         Physical Exam    Nursing note and vitals reviewed.  Constitutional: She appears well-developed and well-nourished. She is not diaphoretic. No distress.   HENT:   Head: Normocephalic and atraumatic.   Mouth/Throat: Oropharynx is clear and moist. Mucous membranes are dry. No oropharyngeal exudate or posterior oropharyngeal erythema.   Eyes: Conjunctivae are normal. Pupils are equal, round, and reactive to light. Right eye exhibits no discharge. Left eye exhibits no discharge. Right conjunctiva is not injected. Left conjunctiva is not injected. No scleral icterus.   Neck: Neck supple.   Normal range of motion.  Cardiovascular:  Normal rate, regular rhythm, normal heart sounds and  intact distal pulses.     Exam reveals no gallop and no friction rub.       No murmur heard.  Pulses:       Dorsalis pedis pulses are 2+ on the right side and 2+ on the left side.   Pulmonary/Chest: Breath sounds normal. No respiratory distress. She has no wheezes. She has no rhonchi. She has no rales.   Abdominal: Abdomen is soft. Bowel sounds are normal. She exhibits no distension. There is no abdominal tenderness.   Musculoskeletal:         General: No tenderness or edema. Normal range of motion.      Cervical back: Normal range of motion and neck supple.     Lymphadenopathy:     She has no cervical adenopathy.   Neurological: She is alert and oriented to person, place, and time. She has normal strength. GCS score is 15. GCS eye subscore is 4. GCS verbal subscore is 5. GCS motor subscore is 6.   Skin: Skin is warm and dry. Capillary refill takes less than 2 seconds. No rash noted. No erythema.   Psychiatric: She has a normal mood and affect. Thought content normal.       ED Course   Procedures  Labs Reviewed   CBC W/ AUTO DIFFERENTIAL - Abnormal; Notable for the following components:       Result Value    MCV 79 (*)     MCH 26.2 (*)     RDW 14.8 (*)     All other components within normal limits   COMPREHENSIVE METABOLIC PANEL - Abnormal; Notable for the following components:    Sodium 130 (*)     Chloride 93 (*)     Glucose 597 (*)     Alkaline Phosphatase 173 (*)     ALT 45 (*)     All other components within normal limits    Narrative:     Glucose critical result(s) called and verbal readback obtained from   Marisela Alvares.  by OMEGA 09/21/2022 13:40   URINALYSIS, REFLEX TO URINE CULTURE - Abnormal; Notable for the following components:    Specific Gravity, UA <=1.005 (*)     Glucose, UA 4+ (*)     Occult Blood UA Trace (*)     All other components within normal limits    Narrative:     Specimen Source->Urine   POCT GLUCOSE - Abnormal; Notable for the following components:    POCT Glucose 479 (*)     All other  components within normal limits   ISTAT PROCEDURE - Abnormal; Notable for the following components:    POC PCO2 49.9 (*)     POC PO2 26 (*)     POC SATURATED O2 45 (*)     All other components within normal limits   POCT GLUCOSE - Abnormal; Notable for the following components:    POCT Glucose 382 (*)     All other components within normal limits   POCT GLUCOSE - Abnormal; Notable for the following components:    POCT Glucose 256 (*)     All other components within normal limits   BETA - HYDROXYBUTYRATE, SERUM   URINALYSIS MICROSCOPIC    Narrative:     Specimen Source->Urine   POCT GLUCOSE MONITORING CONTINUOUS          Imaging Results    None          Medications   sodium chloride 0.9% bolus 500 mL (0 mLs Intravenous Stopped 9/21/22 1457)   acetaminophen tablet 1,000 mg (1,000 mg Oral Given 9/21/22 1443)   lactated ringers bolus 1,000 mL (0 mLs Intravenous Stopped 9/21/22 1559)   insulin regular injection 10 Units 0.1 mL (10 Units Intravenous Given 9/21/22 1451)     Medical Decision Making:   History:   Old Medical Records: I decided to obtain old medical records.  Clinical Tests:   Lab Tests: Ordered and Reviewed        Scribe Attestation:   Scribe #1: I performed the above scribed service and the documentation accurately describes the services I performed. I attest to the accuracy of the note.      ED Course as of 09/21/22 1747   Wed Sep 21, 2022   1223 I, Dr. Caleb Velazquez, personally performed the services described in this documentation.   All medical record entries made by the scribe were at my direction and in my presence.   I have reviewed the chart and agree that the record is accurate and complete.   Caleb Velazquez MD.    [NP]   1223 This is an emergent evaluation of a 42 y.o.female patient with presentation of hyperglycemia, history of diabetes.  Denies any recent changes to health other than related to her sugar, including polydipsia, polyuria, feeling dehydrated.  Exam relatively unremarkable     Initial  differentials include but are not limited to:  hyperglycemia, dehydration, electrolyte abnormality, DKA, RAMOS    Plan:  Basic labs, UA, beta hydroxybutyrate, VBG, IV fluids   [NP]   1304 EKG: NSR at 82 bpm, nl axis, nl intervals, no hypertrophy, TWI in lateral leads as read by me (Dr. Velazquez). TWI new compared to 2020. [NP]   1336 POC PH: 7.357 [NP]   1336 POCT Glucose(!!): 479 [NP]   1336 WBC: 4.53 [NP]   1336 Hemoglobin: 13.7 [NP]   1336 Hematocrit: 41.5 [NP]   1336 Platelets: 221 [NP]   1555 POCT Glucose(!): 256 [NP]   1555 Ready for discharge [NP]      ED Course User Index  [NP] Caleb Velazquez MD                 Clinical Impression:   Final diagnoses:  [R53.1] Generalized weakness  [R73.9] Hyperglycemia (Primary)        ED Disposition Condition    Discharge Stable          ED Prescriptions       Medication Sig Dispense Start Date End Date Auth. Provider    metFORMIN (GLUCOPHAGE) 1000 MG tablet Take 1 tablet (1,000 mg total) by mouth 2 (two) times daily with meals. 60 tablet 9/21/2022 10/21/2022 Caleb Velazquez MD          Follow-up Information       Follow up With Specialties Details Why Contact Info    EDMAR RosalesP-C Family Medicine Schedule an appointment as soon as possible for a visit   [address] [facility] [place] [address]  [phone]               Caleb Velazquez MD  09/21/22 5969

## 2022-09-21 NOTE — FIRST PROVIDER EVALUATION
Emergency Department TeleTriage Encounter Note      CHIEF COMPLAINT    Chief Complaint   Patient presents with    Hyperglycemia     Reports blood sugar of 387 this morning -- reports fatigue. Denies abd pain or vomiting.        VITAL SIGNS   Initial Vitals [09/21/22 1103]   BP Pulse Resp Temp SpO2   (!) 165/94 84 17 98.3 °F (36.8 °C) 98 %      MAP       --            ALLERGIES    Review of patient's allergies indicates:   Allergen Reactions    Codeine Nausea Only and Nausea And Vomiting    Morphine Anaphylaxis and Rash       PROVIDER TRIAGE NOTE  This is a teletriage evaluation of a 42 y.o. female presenting to the ED complaining of uncontrolled hyperglycemia - reports sugars have been running high despite compliance with meds. Reports generalized fatigue and loss of appetite.    Initial orders will be placed and care will be transferred to an alternate provider when patient is roomed for a full evaluation. Any additional orders and the final disposition will be determined by that provider.         ORDERS  Labs Reviewed   CBC W/ AUTO DIFFERENTIAL   COMPREHENSIVE METABOLIC PANEL   URINALYSIS, REFLEX TO URINE CULTURE   BETA - HYDROXYBUTYRATE, SERUM   POCT GLUCOSE MONITORING CONTINUOUS       ED Orders (720h ago, onward)      Start Ordered     Status Ordering Provider    09/21/22 1115 09/21/22 1109  sodium chloride 0.9% bolus 500 mL  ED 1 Time         Ordered ROSA RIZO    09/21/22 1110 09/21/22 1109  POCT Venous Blood Gas Once  Once        Comments: This test should be used for VBGs.  If using this order for other tests (K, creatinine, HCT, PT/INR, lactate etc)  ONLY do so in the case of an emergency or rapid response.Notify Physician if: see parameters below.      Ordered ROSA RIZO    09/21/22 1109 09/21/22 1109  POCT glucose  Once         Ordered ROSA RIZO    09/21/22 1109 09/21/22 1109  Saline lock IV  Once         Ordered ROSA RIZO     09/21/22 1109 09/21/22 1109  CBC auto differential  STAT         Ordered ROSA RIZO    09/21/22 1109 09/21/22 1109  Comprehensive metabolic panel  STAT         Ordered ROSA RIZO.    09/21/22 1109 09/21/22 1109  EKG 12-lead  Once         Ordered ROSA RIZO    09/21/22 1109 09/21/22 1109  Urinalysis, Reflex to Urine Culture Urine, Clean Catch  STAT         Ordered ROSA RIZO.    09/21/22 1109 09/21/22 1109  Beta - Hydroxybutyrate, Serum  Once         Ordered ROSA RIZO              Virtual Visit Note: The provider triage portion of this emergency department evaluation and documentation was performed via eyetok, a HIPAA-compliant telemedicine application, in concert with a tele-presenter in the room. A face to face patient evaluation with one of my colleagues will occur once the patient is placed in an emergency department room.      DISCLAIMER: This note was prepared with [employer] voice recognition transcription software. Garbled syntax, mangled pronouns, and other bizarre constructions may be attributed to that software system.

## 2022-11-28 ENCOUNTER — HOSPITAL ENCOUNTER (EMERGENCY)
Facility: HOSPITAL | Age: 42
Discharge: HOME | End: 2022-11-28
Attending: EMERGENCY MEDICINE
Payer: COMMERCIAL

## 2022-11-28 VITALS
OXYGEN SATURATION: 100 % | TEMPERATURE: 99 F | SYSTOLIC BLOOD PRESSURE: 128 MMHG | HEART RATE: 89 BPM | DIASTOLIC BLOOD PRESSURE: 78 MMHG | RESPIRATION RATE: 18 BRPM | HEIGHT: 62 IN | WEIGHT: 268 LBS | BODY MASS INDEX: 49.32 KG/M2

## 2022-11-28 DIAGNOSIS — L02.91 ABSCESS: Primary | ICD-10-CM

## 2022-11-28 LAB — B-HCG UR QL: NEGATIVE

## 2022-11-28 PROCEDURE — 87070 CULTURE OTHR SPECIMN AEROBIC: CPT | Performed by: EMERGENCY MEDICINE

## 2022-11-28 PROCEDURE — 25000003 PHARM REV CODE 250: Performed by: EMERGENCY MEDICINE

## 2022-11-28 PROCEDURE — 87077 CULTURE AEROBIC IDENTIFY: CPT | Performed by: EMERGENCY MEDICINE

## 2022-11-28 PROCEDURE — 81025 URINE PREGNANCY TEST: CPT | Performed by: EMERGENCY MEDICINE

## 2022-11-28 PROCEDURE — 99284 EMERGENCY DEPT VISIT MOD MDM: CPT | Mod: 25

## 2022-11-28 PROCEDURE — 87186 SC STD MICRODIL/AGAR DIL: CPT | Performed by: EMERGENCY MEDICINE

## 2022-11-28 PROCEDURE — 10060 I&D ABSCESS SIMPLE/SINGLE: CPT

## 2022-11-28 RX ORDER — SULFAMETHOXAZOLE AND TRIMETHOPRIM 800; 160 MG/1; MG/1
1 TABLET ORAL
Status: COMPLETED | OUTPATIENT
Start: 2022-11-28 | End: 2022-11-28

## 2022-11-28 RX ORDER — FLUCONAZOLE 150 MG/1
150 TABLET ORAL DAILY
Qty: 1 TABLET | Refills: 0 | Status: SHIPPED | OUTPATIENT
Start: 2022-11-28 | End: 2022-11-29

## 2022-11-28 RX ORDER — HYDROCODONE BITARTRATE AND ACETAMINOPHEN 5; 325 MG/1; MG/1
1 TABLET ORAL EVERY 6 HOURS PRN
Qty: 6 TABLET | Refills: 0 | Status: SHIPPED | OUTPATIENT
Start: 2022-11-28

## 2022-11-28 RX ORDER — SULFAMETHOXAZOLE AND TRIMETHOPRIM 800; 160 MG/1; MG/1
1 TABLET ORAL 2 TIMES DAILY
Qty: 14 TABLET | Refills: 0 | Status: SHIPPED | OUTPATIENT
Start: 2022-11-28 | End: 2022-12-05

## 2022-11-28 RX ORDER — LIDOCAINE HYDROCHLORIDE 10 MG/ML
10 INJECTION INFILTRATION; PERINEURAL
Status: COMPLETED | OUTPATIENT
Start: 2022-11-28 | End: 2022-11-28

## 2022-11-28 RX ADMIN — LIDOCAINE HYDROCHLORIDE 10 ML: 10 INJECTION, SOLUTION INFILTRATION; PERINEURAL at 01:11

## 2022-11-28 RX ADMIN — SULFAMETHOXAZOLE AND TRIMETHOPRIM 1 TABLET: 800; 160 TABLET ORAL at 02:11

## 2022-11-28 NOTE — ED NOTES
Non adherent applied to right buttock with fluffy gauze as pressure dressing that has been secured with medipore tape. Patient tolerated well. Wound care has been reviewed with patient who verbalized understanding.

## 2022-11-28 NOTE — ED PROVIDER NOTES
Encounter Date: 2022       History     Chief Complaint   Patient presents with    Abscess     Rt. Buttocks     HPI    42-year-old female with past medical history of CHF, diabetes, hypertension presenting with wound to her right buttocks.  Patient reports noticing a boil last Wednesday, states that it drained a few days later, states however became very hard and more painful over last 2-3 days.  She denies any fevers/chills, states her glucose this morning was 130s.  She denies any fevers/chills, abdominal pain, nausea/vomiting/diarrhea, shortness of breath, chest pain, or any further associated symptoms.  Reports she is never had an abscess before, has not had anything like this previously.    Review of patient's allergies indicates:   Allergen Reactions    Codeine Nausea Only and Nausea And Vomiting    Morphine Anaphylaxis and Rash     Past Medical History:   Diagnosis Date    CHF (congestive heart failure)     Diabetes mellitus     Hypertension      Past Surgical History:   Procedure Laterality Date    CARDIAC CATHETERIZATION  2019    CHOLECYSTECTOMY      LEFT HEART CATHETERIZATION Left 2019    Procedure: CATHETERIZATION, HEART, LEFT;  Surgeon: Lenard Corona MD;  Location: [facility] CATH/EP LAB;  Service: Cardiology;  Laterality: Left;    TUBAL LIGATION       Family History   Problem Relation Age of Onset    Diabetes Father     Hypertension Father     Hypertension Mother     Heart disease Paternal Grandmother      Social History     Tobacco Use    Smoking status: Former     Packs/day: 0.25     Types: Cigarettes     Quit date: 2019     Years since quittin.9    Smokeless tobacco: Never    Tobacco comments:     3 - 4 cigs a day   Substance Use Topics    Alcohol use: Not Currently     Comment: social    Drug use: Never     Review of Systems   Constitutional: Negative.    HENT: Negative.     Eyes: Negative.    Respiratory: Negative.     Cardiovascular: Negative.    Gastrointestinal:  Negative.    Genitourinary: Negative.    Musculoskeletal: Negative.    Skin:  Positive for wound (Painful wound noted to right buttock).   Neurological: Negative.      Physical Exam     Initial Vitals [11/28/22 1241]   BP Pulse Resp Temp SpO2   129/64 102 20 98.3 °F (36.8 °C) 95 %      MAP       --         Physical Exam    Nursing note and vitals reviewed.  Constitutional: She appears well-developed and well-nourished. She is not diaphoretic. No distress.   HENT:   Head: Normocephalic and atraumatic.   Eyes: Pupils are equal, round, and reactive to light. Right eye exhibits no discharge. Left eye exhibits no discharge.   Neck: No tracheal deviation present.   Normal range of motion.  Cardiovascular:  Normal rate and regular rhythm.           Pulmonary/Chest: No stridor. No respiratory distress.   Genitourinary:    Genitourinary Comments: Noted area around 5 cm with some central fluctuance and induration as well as overlying erythema present over right buttock, no active discharge or drainage present.     Musculoskeletal:         General: No tenderness or edema. Normal range of motion.      Cervical back: Normal range of motion.     Neurological: She is alert and oriented to person, place, and time.   Skin: Skin is warm and dry.       ED Course   I & D - Incision and Drainage    Date/Time: 11/28/2022 10:12 PM  Location procedure was performed: [facility] EMERGENCY DEPARTMENT  Performed by: John Hadley MD  Authorized by: John Hadley MD   Consent Done: Not Needed  Type: abscess  Body area: anogenital  Location details: gluteal cleft  Anesthesia: local infiltration    Anesthesia:  Local Anesthetic: lidocaine 1% with epinephrine  Anesthetic total: 5 mL    Patient sedated: no  Scalpel size: 11  Incision type: single straight  Complexity: simple  Drainage: purulent  Drainage amount: moderate  Wound treatment: incision, drainage, expression of material and wound left open  Packing material: 1/2 in iodoform  gauze  Complications: No  Estimated blood loss (mL): 5  Specimens: No  Implants: No  Patient tolerance: Patient tolerated the procedure well with no immediate complications      Labs Reviewed   CULTURE, AEROBIC  (SPECIFY SOURCE)   PREGNANCY TEST, URINE RAPID    Narrative:     Specimen Source->Urine          Imaging Results    None          Medications   LIDOcaine HCL 10 mg/ml (1%) injection 10 mL (10 mLs Infiltration Given 11/28/22 1345)   sulfamethoxazole-trimethoprim 800-160mg per tablet 1 tablet (1 tablet Oral Given 11/28/22 7957)                      MDM:    42-year-old female with past medical history of CHF, diabetes, hypertension presenting with wound to her right buttocks.  Physical exam as noted above, ED workup not indicated.  Patient presentation consistent with abscess to right buttock, with incision and drainage performed at bedside, patient tolerating well.  Wound was packed, further wound care instructions given.  Patient will be continued on Bactrim at this time.  Pt is otherwise well appearing without signs of sepsis, hyperglycemia, or further complicating finding.  Discussed diagnosis and further treatment with patient, including f/u.  Return precautions given and all questions answered.  Patient in understanding of plan.  Pt discharged to home improved and stable.              Clinical Impression:   Final diagnoses:  [L02.91] Abscess (Primary)      ED Disposition Condition    Discharge Stable          ED Prescriptions       Medication Sig Dispense Start Date End Date Auth. Provider    HYDROcodone-acetaminophen (NORCO) 5-325 mg per tablet Take 1 tablet by mouth every 6 (six) hours as needed for Pain. 6 tablet 11/28/2022 -- John Hadley MD    sulfamethoxazole-trimethoprim 800-160mg (BACTRIM DS) 800-160 mg Tab Take 1 tablet by mouth 2 (two) times daily. for 7 days 14 tablet 11/28/2022 12/5/2022 John Hadley MD    fluconazole (DIFLUCAN) 150 MG Tab Take 1 tablet (150 mg total) by mouth  once daily. for 1 day 1 tablet 11/28/2022 11/29/2022 John Hadley MD          Follow-up Information       Follow up With Specialties Details Why Contact Info    [facility] Emergency Dept Emergency Medicine Go to  If symptoms worsen [address] [place] [id number]  [phone]             John Hadley MD  11/28/22 4489

## 2022-11-28 NOTE — ED NOTES
Marilyn Madera presents to the ED with c/o abscess to right buttock. Patient reports that she noticed the spot on Sunday and has been getting worse.    THALIA ALVAREZ  Verified patient's name and date of birth. .

## 2022-12-01 LAB — BACTERIA SPEC AEROBE CULT: ABNORMAL

## 2023-05-15 RX ORDER — METFORMIN HYDROCHLORIDE 500 MG/1
TABLET ORAL
Qty: 60 TABLET | Refills: 1 | OUTPATIENT
Start: 2023-05-15

## 2023-05-17 RX ORDER — LISINOPRIL AND HYDROCHLOROTHIAZIDE 12.5; 2 MG/1; MG/1
TABLET ORAL
Qty: 60 TABLET | Refills: 0 | OUTPATIENT
Start: 2023-05-17

## 2023-07-07 RX ORDER — LISINOPRIL AND HYDROCHLOROTHIAZIDE 12.5; 2 MG/1; MG/1
TABLET ORAL
Qty: 60 TABLET | Refills: 0 | OUTPATIENT
Start: 2023-07-07

## 2023-07-07 RX ORDER — METFORMIN HYDROCHLORIDE 500 MG/1
TABLET ORAL
Qty: 60 TABLET | Refills: 1 | OUTPATIENT
Start: 2023-07-07

## 2023-07-25 DIAGNOSIS — I25.118 ATHEROSCLEROSIS OF NATIVE CORONARY ARTERY OF NATIVE HEART WITH STABLE ANGINA PECTORIS: ICD-10-CM

## 2023-07-26 RX ORDER — ATORVASTATIN CALCIUM 80 MG/1
80 TABLET, FILM COATED ORAL NIGHTLY
Qty: 30 TABLET | Refills: 0 | OUTPATIENT
Start: 2023-07-26

## 2023-08-02 DIAGNOSIS — I25.118 ATHEROSCLEROSIS OF NATIVE CORONARY ARTERY OF NATIVE HEART WITH STABLE ANGINA PECTORIS: ICD-10-CM

## 2023-08-02 RX ORDER — ATORVASTATIN CALCIUM 80 MG/1
80 TABLET, FILM COATED ORAL NIGHTLY
Qty: 30 TABLET | Refills: 0 | OUTPATIENT
Start: 2023-08-02

## (undated) DEVICE — SHEATH PINNACLE 6FRX10CM W/GUIDEWIRE

## (undated) DEVICE — CATHETER EXPO MLTPK 6FR 100X110CM

## (undated) DEVICE — SMARTNEEDLE 18G BARE TIP